# Patient Record
Sex: FEMALE | Race: WHITE | URBAN - METROPOLITAN AREA
[De-identification: names, ages, dates, MRNs, and addresses within clinical notes are randomized per-mention and may not be internally consistent; named-entity substitution may affect disease eponyms.]

---

## 2017-04-06 ENCOUNTER — OFFICE VISIT (OUTPATIENT)
Dept: URGENT CARE | Facility: URGENT CARE | Age: 57
End: 2017-04-06
Payer: COMMERCIAL

## 2017-04-06 ENCOUNTER — MYC MEDICAL ADVICE (OUTPATIENT)
Dept: FAMILY MEDICINE | Facility: CLINIC | Age: 57
End: 2017-04-06

## 2017-04-06 VITALS
BODY MASS INDEX: 34.82 KG/M2 | HEART RATE: 78 BPM | DIASTOLIC BLOOD PRESSURE: 83 MMHG | TEMPERATURE: 97.2 F | WEIGHT: 209 LBS | HEIGHT: 65 IN | SYSTOLIC BLOOD PRESSURE: 129 MMHG | OXYGEN SATURATION: 98 %

## 2017-04-06 DIAGNOSIS — Z12.83 SKIN CANCER SCREENING: Primary | ICD-10-CM

## 2017-04-06 DIAGNOSIS — R05.9 COUGH: ICD-10-CM

## 2017-04-06 DIAGNOSIS — J45.998 ASTHMA, PERSISTENT NOT CONTROLLED: Primary | ICD-10-CM

## 2017-04-06 PROCEDURE — 87801 DETECT AGNT MULT DNA AMPLI: CPT | Performed by: FAMILY MEDICINE

## 2017-04-06 PROCEDURE — 99214 OFFICE O/P EST MOD 30 MIN: CPT | Performed by: FAMILY MEDICINE

## 2017-04-06 RX ORDER — MONTELUKAST SODIUM 10 MG/1
10 TABLET ORAL AT BEDTIME
Qty: 30 TABLET | Refills: 1 | Status: SHIPPED | OUTPATIENT
Start: 2017-04-06 | End: 2017-08-14

## 2017-04-06 NOTE — PATIENT INSTRUCTIONS
Understanding Asthma  Asthma causes swelling and narrowing of the airways in your lungs. No one is exactly sure what causes asthma. It is believed to be a combination of inherited and environmental factors.  Healthy lungs  Inside the lungs there are branching airways made of stretchy tissue. Each airway is wrapped with bands of muscle. The airways are more narrow as they go deeper into the lungs. The smallest airways end in clusters of tiny balloon-like air sacs (alveoli). These clusters are surrounded by blood vessels. When you inhale (breathe in), air enters the lungs. It travels down through the airways until it reaches the air sacs. When you exhale (breathe out), air travels up through the airways and out of the lungs. The airways produce mucus that traps particles you breathe in. Normally, the mucus is then swept out of the lungs, by tiny hairs (cilia) that line the airways, to be swallowed or coughed up.  What the lungs do  The air you inhale contains oxygen. When oxygen reaches the air sacs, it passes into the blood vessels surrounding the sacs. Your blood then delivers oxygen to all of your cells. Carbon dioxide is removed from the body in a similar way, as you exhale.  When you have asthma     1. Tightened muscle  2. Swollen lining  3. Increased mucus   People with asthma have very sensitive airways. This means the airways react to certain things called triggers (such as pollen, dust, or smoke) and become swollen and narrowed. Inflammation makes the airways swollen and narrowed. This is a chronic (long-lasting or recurring) problem. The airways may not always be narrowed enough to notice breathing problems.  Symptoms of chronic inflammation:     Coughing    Chest tightness    Shortness of breath    Wheezing (a whistling noise, especially when breathing out)    Low energy or feeling tired  Over time, chronic mild inflammation can lead to permanent scarring of airways and loss of lung function.  Moderate  flare-ups  When sensitive airways are irritated by a trigger, the muscles around the airways tighten. The lining of the airways swells. Thick, sticky mucus increases and clogs the airways. All of this makes you work harder to keep breathing.  Symptoms of moderate flare-ups:    Coughing, especially at night    Getting tired or out of breath easily    Wheezing    Chest tightness    Faster breathing when at rest  Severe flare-ups   Severe flare-ups are life-threatening. They can cause brain damage or death. In a severe flare-up, the muscle tightening, swelling, and mucus production are even worse. Breathing is extremely difficult. Your body can't get enough oxygen and can't remove carbon dioxide. Waste gas is trapped in the alveoli, and gas exchange can t occur. The body is not getting enough oxygen. Without oxygen, body tissues, especially brain tissue, begin to die. If this goes on for long, it can lead to brain damage or death.  Call 911, or have someone call for you, if you have any of these symptoms and they are not relieved right away by taking your quick-relief medication as prescribed:    Severe difficulty breathing    Being too short of breath to talk or walk    Lips or fingers turning blue    Feeling lightheaded or dizzy, as though you are about to pass out    Peak flow less than 50% of your personal best, if you use peak flow monitoring  Because asthma is a long-term condition, it is important to work with your health care provider to manage it. If you smoke, get help to quit. Know your triggers and figure out how to avoid them. And, it is very important that you take your medications as instructed. That means taking them, even when you feel good.    5152-7399 [employer]. [address] [place] [address], [place] [address]. All rights reserved. This information is not intended as a substitute for professional medical care. Always follow your healthcare professional's instructions.

## 2017-04-06 NOTE — TELEPHONE ENCOUNTER
Kassie,  --Please see patient's MyChart msg in this encounter for rest of her comments.  --I placed derm referral and told her to schedule here for podiatry.    Melanie Guardado RN

## 2017-04-06 NOTE — MR AVS SNAPSHOT
After Visit Summary   4/6/2017    Ofelia Valenzuela    MRN: [id number]           Patient Information     Date Of Birth          1960        Visit Information        Provider Department      4/6/2017 5:45 PM Whitney Gorman MD [facility]        Today's Diagnoses     Asthma, persistent not controlled    -  1    Cough          Care Instructions      Understanding Asthma  Asthma causes swelling and narrowing of the airways in your lungs. No one is exactly sure what causes asthma. It is believed to be a combination of inherited and environmental factors.  Healthy lungs  Inside the lungs there are branching airways made of stretchy tissue. Each airway is wrapped with bands of muscle. The airways are more narrow as they go deeper into the lungs. The smallest airways end in clusters of tiny balloon-like air sacs (alveoli). These clusters are surrounded by blood vessels. When you inhale (breathe in), air enters the lungs. It travels down through the airways until it reaches the air sacs. When you exhale (breathe out), air travels up through the airways and out of the lungs. The airways produce mucus that traps particles you breathe in. Normally, the mucus is then swept out of the lungs, by tiny hairs (cilia) that line the airways, to be swallowed or coughed up.  What the lungs do  The air you inhale contains oxygen. When oxygen reaches the air sacs, it passes into the blood vessels surrounding the sacs. Your blood then delivers oxygen to all of your cells. Carbon dioxide is removed from the body in a similar way, as you exhale.  When you have asthma     1. Tightened muscle  2. Swollen lining  3. Increased mucus   People with asthma have very sensitive airways. This means the airways react to certain things called triggers (such as pollen, dust, or smoke) and become swollen and narrowed. Inflammation makes the airways swollen and narrowed. This is a chronic  (long-lasting or recurring) problem. The airways may not always be narrowed enough to notice breathing problems.  Symptoms of chronic inflammation:     Coughing    Chest tightness    Shortness of breath    Wheezing (a whistling noise, especially when breathing out)    Low energy or feeling tired  Over time, chronic mild inflammation can lead to permanent scarring of airways and loss of lung function.  Moderate flare-ups  When sensitive airways are irritated by a trigger, the muscles around the airways tighten. The lining of the airways swells. Thick, sticky mucus increases and clogs the airways. All of this makes you work harder to keep breathing.  Symptoms of moderate flare-ups:    Coughing, especially at night    Getting tired or out of breath easily    Wheezing    Chest tightness    Faster breathing when at rest  Severe flare-ups   Severe flare-ups are life-threatening. They can cause brain damage or death. In a severe flare-up, the muscle tightening, swelling, and mucus production are even worse. Breathing is extremely difficult. Your body can't get enough oxygen and can't remove carbon dioxide. Waste gas is trapped in the alveoli, and gas exchange can t occur. The body is not getting enough oxygen. Without oxygen, body tissues, especially brain tissue, begin to die. If this goes on for long, it can lead to brain damage or death.  Call 911, or have someone call for you, if you have any of these symptoms and they are not relieved right away by taking your quick-relief medication as prescribed:    Severe difficulty breathing    Being too short of breath to talk or walk    Lips or fingers turning blue    Feeling lightheaded or dizzy, as though you are about to pass out    Peak flow less than 50% of your personal best, if you use peak flow monitoring  Because asthma is a long-term condition, it is important to work with your health care provider to manage it. If you smoke, get help to quit. Know your triggers and  figure out how to avoid them. And, it is very important that you take your medications as instructed. That means taking them, even when you feel good.    6890-4155 [employer]. [address] [place] [address], [place] [address]. All rights reserved. This information is not intended as a substitute for professional medical care. Always follow your healthcare professional's instructions.              Follow-ups after your visit        Follow-up notes from your care team     Return in about 3 weeks (around 4/27/2017).      Who to contact     If you have questions or need follow up information about today's clinic visit or your schedule please contact [facility] directly at [phone].  Normal or non-critical lab and imaging results will be communicated to you by [employer], letter or phone within 4 business days after the clinic has received the results. If you do not hear from us within 7 days, please contact the clinic through [employer] or phone. If you have a critical or abnormal lab result, we will notify you by phone as soon as possible.  Submit refill requests through [employer] or call your pharmacy and they will forward the refill request to us. Please allow 3 business days for your refill to be completed.          Additional Information About Your Visit        GigmaxharBRAND-YOURSELF Information     [employer] gives you secure access to your electronic health record. If you see a primary care provider, you can also send messages to your care team and make appointments. If you have questions, please call your primary care clinic.  If you do not have a primary care provider, please call [phone] and they will assist you.        Care EveryWhere ID     This is your Care EveryWhere ID. This could be used by other organizations to access your [facility] medical records  [id number]        Your Vitals Were     Pulse Temperature Height Last Period Pulse Oximetry BMI (Body Mass Index)    78 97.2  F (36.2  C)  "(Tympanic) 5' 4.5\" (1.638 m) 12/07/2006 98% 35.32 kg/m2       Blood Pressure from Last 3 Encounters:   04/06/17 129/83   12/21/16 118/72   11/22/16 136/85    Weight from Last 3 Encounters:   04/06/17 209 lb (94.8 kg)   12/21/16 207 lb (93.9 kg)   11/22/16 210 lb (95.3 kg)              We Performed the Following     Jocelyndejulia pedroza parapert DNA pcr          Today's Medication Changes          These changes are accurate as of: 4/6/17  6:14 PM.  If you have any questions, ask your nurse or doctor.               Start taking these medicines.        Dose/Directions    montelukast 10 MG tablet   Commonly known as:  SINGULAIR   Used for:  Asthma, persistent not controlled   Started by:  Whitney Gorman MD        Dose:  10 mg   Take 1 tablet (10 mg) by mouth At Bedtime   Quantity:  30 tablet   Refills:  1            Where to get your medicines      Some of these will need a paper prescription and others can be bought over the counter.  Ask your nurse if you have questions.     Bring a paper prescription for each of these medications     montelukast 10 MG tablet                Primary Care Provider Office Phone # Fax #    Kassie ZEB Rivera [facility] [phone]       [place] [id number]  [facility] [address]        Thank you!     Thank you for choosing [facility]  for your care. Our goal is always to provide you with excellent care. Hearing back from our patients is one way we can continue to improve our services. Please take a few minutes to complete the written survey that you may receive in the mail after your visit with us. Thank you!             Your Updated Medication List - Protect others around you: Learn how to safely use, store and throw away your medicines at www.disposemymeds.org.          This list is accurate as of: 4/6/17  6:14 PM.  Always use your most recent med list.                   Brand Name Dispense Instructions for use    " albuterol 108 (90 BASE) MCG/ACT Inhaler    albuterol    2 Inhaler    Inhale 2 puffs into the lungs every 4 hours as needed for shortness of breath / dyspnea or wheezing       Calcium 2211-1826 MG-UNIT Chew      Take 2 chew tab by mouth daily.       DAILY MULTIVITAMIN PO      Take 1 tablet by mouth daily.       fluticasone 110 MCG/ACT Inhaler    FLOVENT HFA    1 Inhaler    Inhale 2 puffs into the lungs 2 times daily       fluticasone 50 MCG/ACT spray    FLONASE    1 Bottle    Spray 1-2 sprays into both nostrils daily       glucosamine complex Tabs      Take 1 capsule by mouth daily.       montelukast 10 MG tablet    SINGULAIR    30 tablet    Take 1 tablet (10 mg) by mouth At Bedtime       predniSONE 20 MG tablet    DELTASONE    10 tablet    Take 1 tablet (20 mg) by mouth 2 times daily       vitamin D 1000 UNITS capsule      Take 1 capsule by mouth daily.

## 2017-04-06 NOTE — NURSING NOTE
"Chief Complaint   Patient presents with     Urgent Care     Pt in clinic to have eval for intermittent cough for 5 months.     Respiratory Problems       Initial /83 (BP Location: Left arm, Patient Position: Chair, Cuff Size: Adult Large)  Pulse 78  Temp 97.2  F (36.2  C) (Tympanic)  Ht 5' 4.5\" (1.638 m)  Wt 209 lb (94.8 kg)  LMP 12/07/2006  SpO2 98%  BMI 35.32 kg/m2 Estimated body mass index is 35.32 kg/(m^2) as calculated from the following:    Height as of this encounter: 5' 4.5\" (1.638 m).    Weight as of this encounter: 209 lb (94.8 kg).  Medication Reconciliation: complete   Toña Alvarez/ MA    "

## 2017-04-07 DIAGNOSIS — Z87.19 HISTORY OF MELENA: Primary | ICD-10-CM

## 2017-04-07 DIAGNOSIS — M79.673 PAIN IN UNSPECIFIED FOOT: ICD-10-CM

## 2017-04-10 ENCOUNTER — TELEPHONE (OUTPATIENT)
Dept: FAMILY MEDICINE | Facility: CLINIC | Age: 57
End: 2017-04-10

## 2017-04-10 LAB
B PERT+PARAPERT DNA PNL SPEC NAA+PROBE: NORMAL
SPECIMEN SOURCE: NORMAL

## 2017-04-10 NOTE — TELEPHONE ENCOUNTER
Reason for Call:  Request for results:    Name of test or procedure: Ilda pedroza parapert DNA pcr [[id number]] (Order [id number])     Date of test of procedure: 4/6/2017    Location of the test or procedure:  Urgent Care    OK to leave the result message on voice mail or with a family member? YES    Phone number Patient can be reached at:  Cell number on file:    Telephone Information:   Mobile [phone]       Additional comments: Patient is requesting a call back once the lab result from the date above is final. States she still has a bad cough and needs these results to determine if she should be on an anti-biotic or not. Please follow up. Thanks!    Call taken on 4/10/2017 at 1:41 PM by Elda Dykes

## 2017-04-12 ENCOUNTER — MYC MEDICAL ADVICE (OUTPATIENT)
Dept: FAMILY MEDICINE | Facility: CLINIC | Age: 57
End: 2017-04-12

## 2017-04-12 DIAGNOSIS — J45.909 ASTHMA: ICD-10-CM

## 2017-04-12 DIAGNOSIS — R05.9 COUGH: Primary | ICD-10-CM

## 2017-04-12 NOTE — TELEPHONE ENCOUNTER
Routing to PCP.    Kassie-Please review and advise.  Would you like patient to follow up with you tomorrow or Friday?    Thank you!  SALVATORE EnglishN, RN

## 2017-04-13 DIAGNOSIS — J45.31 MILD PERSISTENT ASTHMA WITH ACUTE EXACERBATION: Primary | ICD-10-CM

## 2017-04-13 RX ORDER — PREDNISONE 20 MG/1
TABLET ORAL
Qty: 20 TABLET | Refills: 0 | Status: SHIPPED | OUTPATIENT
Start: 2017-04-13 | End: 2017-08-14

## 2017-04-27 ENCOUNTER — OFFICE VISIT (OUTPATIENT)
Dept: FAMILY MEDICINE | Facility: CLINIC | Age: 57
End: 2017-04-27
Payer: COMMERCIAL

## 2017-04-27 ENCOUNTER — TELEPHONE (OUTPATIENT)
Dept: FAMILY MEDICINE | Facility: CLINIC | Age: 57
End: 2017-04-27

## 2017-04-27 VITALS
SYSTOLIC BLOOD PRESSURE: 122 MMHG | TEMPERATURE: 97.5 F | DIASTOLIC BLOOD PRESSURE: 74 MMHG | BODY MASS INDEX: 36.5 KG/M2 | WEIGHT: 216 LBS | OXYGEN SATURATION: 97 % | HEART RATE: 79 BPM | RESPIRATION RATE: 16 BRPM

## 2017-04-27 DIAGNOSIS — J45.31 MILD PERSISTENT ASTHMA WITH ACUTE EXACERBATION: Primary | ICD-10-CM

## 2017-04-27 DIAGNOSIS — B02.9 HERPES ZOSTER WITHOUT COMPLICATION: Primary | ICD-10-CM

## 2017-04-27 DIAGNOSIS — R05.9 COUGH: ICD-10-CM

## 2017-04-27 DIAGNOSIS — B02.9 HERPES ZOSTER WITHOUT COMPLICATION: ICD-10-CM

## 2017-04-27 DIAGNOSIS — R21 FACIAL RASH: ICD-10-CM

## 2017-04-27 PROCEDURE — 87252 VIRUS INOCULATION TISSUE: CPT | Performed by: NURSE PRACTITIONER

## 2017-04-27 PROCEDURE — 99214 OFFICE O/P EST MOD 30 MIN: CPT | Performed by: NURSE PRACTITIONER

## 2017-04-27 RX ORDER — BUDESONIDE AND FORMOTEROL FUMARATE DIHYDRATE 160; 4.5 UG/1; UG/1
2 AEROSOL RESPIRATORY (INHALATION) 2 TIMES DAILY
Qty: 1 INHALER | Refills: 1 | Status: SHIPPED | OUTPATIENT
Start: 2017-04-27 | End: 2017-08-14

## 2017-04-27 RX ORDER — VALACYCLOVIR HYDROCHLORIDE 1 G/1
1000 TABLET, FILM COATED ORAL 3 TIMES DAILY
Qty: 21 TABLET | Refills: 0 | Status: SHIPPED | OUTPATIENT
Start: 2017-04-27 | End: 2017-08-14

## 2017-04-27 NOTE — TELEPHONE ENCOUNTER
Patient with possible shingles to opthalmic dermatome.  Needs opthalmology appt tomorrow to r/o occular involvement.  Pt describes pressure sensation behind eyes but no vision changes.    Triage can we try to get pt in tomorrow with U rojelio QUEVEDO optbruce or Dr. Hawkins.  Please call pt with appt info, her schedule is open tomorrow.    [facility] [phone]      Dr. Miki Hawkins, opthalmologist    Primary Location  [facility]   [address]   [place] [address]   Phone:   [phone]

## 2017-04-27 NOTE — LETTER
My Asthma Action Plan  Name: Ofelia Valenzuela   YOB: 1960  Date: 4/27/2017   My doctor: ZEB Ayoub CNP   [place] [facility] [place] [facility] [place]        My Control Medicine: Fluticasone propionate (Flovent) -   mcg 2 puffs teice daily  Montelukast (Singulair) -  10 mg once daily  My Rescue Medicine: Albuterol (Proair/Ventolin/Proventil) inhaler 2 puffs as needed   My Asthma Severity: mild persistent  Avoid your asthma triggers: smoke, upper respiratory infections and dust mites               GREEN ZONE     Good Control    I feel good    No cough or wheeze    Can work, sleep and play without asthma symptoms       Take your asthma control medicine every day.     1. If exercise triggers your asthma, take your rescue medication    15 minutes before exercise or sports, and    During exercise if you have asthma symptoms  2. Spacer to use with inhaler: If you have a spacer, make sure to use it with your inhaler             YELLOW ZONE     Getting Worse  I have ANY of these:    I do not feel good    Cough or wheeze    Chest feels tight    Wake up at night   1. Keep taking your Green Zone medications  2. Start taking your rescue medicine:    every 20 minutes for up to 1 hour. Then every 4 hours for 24-48 hours.  3. If you stay in the Yellow Zone for more than 12-24 hours, contact your doctor.  4. If you do not return to the Green Zone in 12-24 hours or you get worse, start taking your oral steroid medicine if prescribed by your provider.           RED ZONE     Medical Alert - Get Help  I have ANY of these:    I feel awful    Medicine is not helping    Breathing getting harder    Trouble walking or talking    Nose opens wide to breathe       1. Take your rescue medicine NOW  2. If your provider has prescribed an oral steroid medicine, start taking it NOW  3. Call your doctor NOW  4. If you are still in the Red Zone after 20 minutes and you have not reached your doctor:    Take your rescue  medicine again and    Call 911 or go to the emergency room right away    See your regular doctor within 2 weeks of an Emergency Room or Urgent Care visit for follow-up treatment.        Electronically signed by: Saray Bashir, April 27, 2017    Annual Reminders:  Meet with Asthma Educator,  Flu Shot in the Fall, consider Pneumonia Vaccination for patients with asthma (aged 19 and older).    Pharmacy: [facility] [address] [place] [address] AT [address]                    Asthma Triggers  How To Control Things That Make Your Asthma Worse    Triggers are things that make your asthma worse.  Look at the list below to help you find your triggers and what you can do about them.  You can help prevent asthma flare-ups by staying away from your triggers.      Trigger                                                          What you can do   Cigarette Smoke  Tobacco smoke can make asthma worse. Do not allow smoking in your home, car or around you.  Be sure no one smokes at a child s day care or school.  If you smoke, ask your health care provider for ways to help you quit.  Ask family members to quit too.  Ask your health care provider for a referral to Quit Plan to help you quit smoking, or call 1-854-578-PLAN.     Colds, Flu, Bronchitis  These are common triggers of asthma. Wash your hands often.  Don t touch your eyes, nose or mouth.  Get a flu shot every year.     Dust Mites  These are tiny bugs that live in cloth or carpet. They are too small to see. Wash sheets and blankets in hot water every week.   Encase pillows and mattress in dust mite proof covers.  Avoid having carpet if you can. If you have carpet, vacuum weekly.   Use a dust mask and HEPA vacuum.   Pollen and Outdoor Mold  Some people are allergic to trees, grass, or weed pollen, or molds. Try to keep your windows closed.  Limit time out doors when pollen count is high.   Ask you health care provider about taking medicine during  allergy season.     Animal Dander  Some people are allergic to skin flakes, urine or saliva from pets with fur or feathers. Keep pets with fur or feathers out of your home.    If you can t keep the pet outdoors, then keep the pet out of your bedroom.  Keep the bedroom door closed.  Keep pets off cloth furniture and away from stuffed toys.     Mice, Rats, and Cockroaches  Some people are allergic to the waste from these pests.   Cover food and garbage.  Clean up spills and food crumbs.  Store grease in the refrigerator.   Keep food out of the bedroom.   Indoor Mold  This can be a trigger if your home has high moisture. Fix leaking faucets, pipes, or other sources of water.   Clean moldy surfaces.  Dehumidify basement if it is damp and smelly.   Smoke, Strong Odors, and Sprays  These can reduce air quality. Stay away from strong odors and sprays, such as perfume, powder, hair spray, paints, smoke incense, paint, cleaning products, candles and new carpet.   Exercise or Sports  Some people with asthma have this trigger. Be active!  Ask your doctor about taking medicine before sports or exercise to prevent symptoms.    Warm up for 5-10 minutes before and after sports or exercise.     Other Triggers of Asthma  Cold air:  Cover your nose and mouth with a scarf.  Sometimes laughing or crying can be a trigger.  Some medicines and food can trigger asthma.

## 2017-04-27 NOTE — NURSING NOTE
"Chief Complaint   Patient presents with     Derm Problem       Initial /74 (BP Location: Right arm, Patient Position: Chair, Cuff Size: Adult Large)  Pulse 79  Temp 97.5  F (36.4  C) (Tympanic)  Resp 16  Wt 216 lb (98 kg)  LMP 12/07/2006  SpO2 97%  BMI 36.5 kg/m2 Estimated body mass index is 36.5 kg/(m^2) as calculated from the following:    Height as of 4/6/17: 5' 4.5\" (1.638 m).    Weight as of this encounter: 216 lb (98 kg).  Medication Reconciliation: complete     Saray Bashir, CMA    "

## 2017-04-27 NOTE — MR AVS SNAPSHOT
After Visit Summary   4/27/2017    Ofelia Valenzuela    MRN: [id number]           Patient Information     Date Of Birth          1960        Visit Information        Provider Department      4/27/2017 4:20 PM Kassie Moss APRN CNP [facility]        Today's Diagnoses     Mild persistent asthma with acute exacerbation    -  1    Facial rash        Herpes zoster without complication        Cough          Care Instructions    1.  For cough switch to combination inhaler (symbicort, advair, or dulera) instead of flovent.  Continue albuterol as needed.  If cough still not improving see pulmonology    2.  Rash to face could be shingles.  Start valtrex three times a day for 7 days.  Rash is contagious until scabbed over.  Avoid children under 12mo, elderly, and pregnant people.  Viral culture pending.  Follow up with eye specialist tomorrow to ensure no eye lesions.          Follow-ups after your visit        Additional Services     PULMONARY MEDICINE REFERRAL       Your provider has referred you to: [facility]: Lung Disease and Pulmonary Clinic [facility] [phone]   [email or web address]    Please be aware that coverage of these services is subject to the terms and limitations of your health insurance plan.  Call member services at your health plan with any benefit or coverage questions.      Please bring the following with you to your appointment:    (1) Any X-Rays, CTs or MRIs which have been performed.  Contact the facility where they were done to arrange for  prior to your scheduled appointment.    (2) List of current medications   (3) This referral request   (4) Any documents/labs given to you for this referral                  Who to contact     If you have questions or need follow up information about today's clinic visit or your schedule please contact [facility] directly at [phone].  Normal or  non-critical lab and imaging results will be communicated to you by [employer], letter or phone within 4 business days after the clinic has received the results. If you do not hear from us within 7 days, please contact the clinic through [employer] or phone. If you have a critical or abnormal lab result, we will notify you by phone as soon as possible.  Submit refill requests through [employer] or call your pharmacy and they will forward the refill request to us. Please allow 3 business days for your refill to be completed.          Additional Information About Your Visit        [employer] Information     [employer] gives you secure access to your electronic health record. If you see a primary care provider, you can also send messages to your care team and make appointments. If you have questions, please call your primary care clinic.  If you do not have a primary care provider, please call [phone] and they will assist you.        Care EveryWhere ID     This is your Care EveryWhere ID. This could be used by other organizations to access your [facility] medical records  [id number]        Your Vitals Were     Pulse Temperature Respirations Last Period Pulse Oximetry BMI (Body Mass Index)    79 97.5  F (36.4  C) (Tympanic) 16 12/07/2006 97% 36.5 kg/m2       Blood Pressure from Last 3 Encounters:   04/27/17 122/74   04/06/17 129/83   12/21/16 118/72    Weight from Last 3 Encounters:   04/27/17 216 lb (98 kg)   04/06/17 209 lb (94.8 kg)   12/21/16 207 lb (93.9 kg)              We Performed the Following     Asthma Action Plan (AAP)     PULMONARY MEDICINE REFERRAL     Viral culture          Today's Medication Changes          These changes are accurate as of: 4/27/17  4:57 PM.  If you have any questions, ask your nurse or doctor.               Start taking these medicines.        Dose/Directions    budesonide-formoterol 160-4.5 MCG/ACT Inhaler   Commonly known as:  SYMBICORT   Used for:  Mild persistent asthma with acute  exacerbation   Started by:  Kassie Moss APRN CNP        Dose:  2 puff   Inhale 2 puffs into the lungs 2 times daily   Quantity:  1 Inhaler   Refills:  1       fluticasone-salmeterol 250-50 MCG/DOSE diskus inhaler   Commonly known as:  ADVAIR   Used for:  Mild persistent asthma with acute exacerbation   Started by:  Kassie Moss APRN CNP        Dose:  1 puff   Inhale 1 puff into the lungs 2 times daily   Quantity:  1 Inhaler   Refills:  1       mometasone-formoterol 200-5 MCG/ACT oral inhaler   Commonly known as:  DULERA   Used for:  Mild persistent asthma with acute exacerbation   Started by:  Kassie Moss APRN CNP        Dose:  2 puff   Inhale 2 puffs into the lungs 2 times daily   Quantity:  13 g   Refills:  1       valACYclovir 1000 mg tablet   Commonly known as:  VALTREX   Used for:  Herpes zoster without complication   Started by:  Kassie Moss APRN CNP        Dose:  1000 mg   Take 1 tablet (1,000 mg) by mouth 3 times daily   Quantity:  21 tablet   Refills:  0         Stop taking these medicines if you haven't already. Please contact your care team if you have questions.     fluticasone 110 MCG/ACT Inhaler   Commonly known as:  FLOVENT HFA   Stopped by:  Kassie Moss APRN CNP                Where to get your medicines      These medications were sent to [email or web address] [id number] [place] [address] AT [address] [address]     Phone:  [phone]     budesonide-formoterol 160-4.5 MCG/ACT Inhaler    fluticasone-salmeterol 250-50 MCG/DOSE diskus inhaler    mometasone-formoterol 200-5 MCG/ACT oral inhaler    valACYclovir 1000 mg tablet                Primary Care Provider Office Phone # Fax #    ZEB Ayoub [phone]       [facility] [address]  [facility] [address]        Thank you!     Thank you for choosing [facility]  for your care. Our goal  is always to provide you with excellent care. Hearing back from our patients is one way we can continue to improve our services. Please take a few minutes to complete the written survey that you may receive in the mail after your visit with us. Thank you!             Your Updated Medication List - Protect others around you: Learn how to safely use, store and throw away your medicines at www.disposemymeds.org.          This list is accurate as of: 4/27/17  4:57 PM.  Always use your most recent med list.                   Brand Name Dispense Instructions for use    albuterol 108 (90 BASE) MCG/ACT Inhaler    albuterol    2 Inhaler    Inhale 2 puffs into the lungs every 4 hours as needed for shortness of breath / dyspnea or wheezing       budesonide-formoterol 160-4.5 MCG/ACT Inhaler    SYMBICORT    1 Inhaler    Inhale 2 puffs into the lungs 2 times daily       Calcium 7651-4720 MG-UNIT Chew      Take 2 chew tab by mouth daily Reported on 4/27/2017       DAILY MULTIVITAMIN PO      Take 1 tablet by mouth daily Reported on 4/27/2017       fluticasone 50 MCG/ACT spray    FLONASE    1 Bottle    Spray 1-2 sprays into both nostrils daily       fluticasone-salmeterol 250-50 MCG/DOSE diskus inhaler    ADVAIR    1 Inhaler    Inhale 1 puff into the lungs 2 times daily       glucosamine complex Tabs      Take 1 capsule by mouth daily Reported on 4/27/2017       mometasone-formoterol 200-5 MCG/ACT oral inhaler    DULERA    13 g    Inhale 2 puffs into the lungs 2 times daily       montelukast 10 MG tablet    SINGULAIR    30 tablet    Take 1 tablet (10 mg) by mouth At Bedtime       * predniSONE 20 MG tablet    DELTASONE    10 tablet    Take 1 tablet (20 mg) by mouth 2 times daily       * predniSONE 20 MG tablet    DELTASONE    20 tablet    Take 3 tabs (60 mg) by mouth daily x 3 days, 2 tabs (40 mg) daily x 3 days, 1 tab (20 mg) daily x 3 days, then 1/2 tab (10 mg) x 3 days.       valACYclovir 1000 mg tablet    VALTREX    21 tablet     Take 1 tablet (1,000 mg) by mouth 3 times daily       vitamin D 1000 UNITS capsule      Take 1 capsule by mouth daily Reported on 4/27/2017       * Notice:  This list has 2 medication(s) that are the same as other medications prescribed for you. Read the directions carefully, and ask your doctor or other care provider to review them with you.

## 2017-04-27 NOTE — TELEPHONE ENCOUNTER
Patient has been battling a URI for the past 6 weeks to 2 months  Recently completed a prednisone taper on 4/24/17.  Patient continues with cough that is barky and non-productive  Has now developed a red rash on her forehead and is concerned she may have shingles.  Appointment schedule for later today to assess rash.  Batsheva Anaya RN

## 2017-04-27 NOTE — TELEPHONE ENCOUNTER
Writer called Dr. Willett's office, spoke with Lizzie and reviewed message per below from CHRISTAL Moss.    Lizzie asked for demographic information for patient and insurance information and writer relayed that information to Lizzie.    Lizzie informed writer they would reach out to patient directly.    Kassie lucas.    SALVATORE EnglishN, RN

## 2017-04-27 NOTE — PATIENT INSTRUCTIONS
1.  For cough switch to combination inhaler (symbicort, advair, or dulera) instead of flovent.  Continue albuterol as needed.  If cough still not improving see pulmonology    2.  Rash to face could be shingles.  Start valtrex three times a day for 7 days.  Rash is contagious until scabbed over.  Avoid children under 12mo, elderly, and pregnant people.  Viral culture pending.  Follow up with eye specialist tomorrow to ensure no eye lesions.

## 2017-04-27 NOTE — PROGRESS NOTES
SUBJECTIVE:                                                    Ofelia Valenzuela is a 56 year old female who presents to clinic today for the following health issues:    Rash      Duration: 2 days    Description  Location: right side forehead  Itching: moderate    Intensity:  moderate    Accompanying signs and symptoms: irritating dry cough    History (similar episodes/previous evaluation): has been on Prednisone for 3 weeks for asthma flare and cough. Had Pertussis exposure in March    Precipitating or alleviating factors:  New exposures:  [employer] plant near her desk area  Recent travel: no      Therapies tried and outcome: none     No prodrome to rash.  Noticed to right forehead 2 days ago.  Rash is not painful, it is itchy.  Started as several lesions, more lesions today.  No history similar rash. Notes pressure sensation to both eyes which started today, no vision changes.      Pt developed cough in march.  Hx of asthma, on flovent twice a day and albuterol.  Treated at home with prednisone in March with improvement in symptoms.  Cough then reoccurred end of march.  Seen again in UC 4/6/17, neg pertussis.  prednisone taper 4/13/17.    Reports generalized achiness.  Dry cough, coughing fit once a day.  Has improved with prednisone.  Ongoing irritation, interfering with her ability to sing.  Raspiness.  No her usual lingering bronchial cough, feels like ongoing lung irritation that never went away.  In early April would wake up with chills, no objective fever.  Mild rhinitis and raspy throat, no other URI symptoms.  Started singulair after UC visit, not really helping.  No history seasonal allergies.  Generalized fatgiue.  No wheezing or chest tightness, no distinct shortness of breath.  Albuterol seems to help.      Patient Active Problem List   Diagnosis     Mild persistent asthma     Allergic rhinitis     Enthesopathy     CARDIOVASCULAR SCREENING; LDL GOAL LESS THAN 160     Knee pain     Rotator cuff  disorder     Multinodular goiter     Obesity     Family history of diabetes mellitus     Past Surgical History:   Procedure Laterality Date     C NONSPECIFIC PROCEDURE  1995    fx (R) 5th metatarsal  MTT or MTP joints not involved     SURGICAL HISTORY OF -   1976    tumor removed from lymph node     SURGICAL HISTORY OF -   1969    tonsillectomy       Social History   Substance Use Topics     Smoking status: Never Smoker     Smokeless tobacco: Never Used     Alcohol use Yes      Comment: Very Rarely, glass of wine once a week/month     Family History   Problem Relation Age of Onset     C.A.D. Father      CABG at age 70     DIABETES Father      type 2 at age 60     Hypertension Father      Cardiovascular Father      tachycardia, valve problem     Lipids Father      Obesity Father      DIABETES Mother      Type 2-at age 60     Eye Disorder Mother      cataract     Obesity Mother      Respiratory Mother      lung problems from smoking-chronic cough     DIABETES Maternal Grandmother      type 2     Obesity Maternal Grandmother      DIABETES Paternal Grandmother      type 2     CANCER Paternal Grandmother      unknown type--metastisize everywhere     Obesity Paternal Grandmother      DIABETES Paternal Grandfather      type 2     Alcohol/Drug Paternal Grandfather      Obesity Paternal Grandfather      DIABETES Sister      gestational     DIABETES Brother      type 2-age 40 (2 brothers)     Alcohol/Drug Brother      Gynecology Sister      endometriosis, and unusual vag bleeding      Obesity Sister      Obesity Brother      Obesity Maternal Grandfather          Current Outpatient Prescriptions   Medication Sig Dispense Refill     fluticasone-salmeterol (ADVAIR) 250-50 MCG/DOSE diskus inhaler Inhale 1 puff into the lungs 2 times daily 1 Inhaler 1     mometasone-formoterol (DULERA) 200-5 MCG/ACT oral inhaler Inhale 2 puffs into the lungs 2 times daily 13 g 1     budesonide-formoterol (SYMBICORT) 160-4.5 MCG/ACT Inhaler Inhale  2 puffs into the lungs 2 times daily 1 Inhaler 1     valACYclovir (VALTREX) 1000 mg tablet Take 1 tablet (1,000 mg) by mouth 3 times daily 21 tablet 0     montelukast (SINGULAIR) 10 MG tablet Take 1 tablet (10 mg) by mouth At Bedtime 30 tablet 1     albuterol (ALBUTEROL) 108 (90 BASE) MCG/ACT Inhaler Inhale 2 puffs into the lungs every 4 hours as needed for shortness of breath / dyspnea or wheezing 2 Inhaler 3     predniSONE (DELTASONE) 20 MG tablet Take 3 tabs (60 mg) by mouth daily x 3 days, 2 tabs (40 mg) daily x 3 days, 1 tab (20 mg) daily x 3 days, then 1/2 tab (10 mg) x 3 days. (Patient not taking: Reported on 4/27/2017) 20 tablet 0     predniSONE (DELTASONE) 20 MG tablet Take 1 tablet (20 mg) by mouth 2 times daily (Patient not taking: Reported on 4/27/2017) 10 tablet 0     fluticasone (FLONASE) 50 MCG/ACT spray Spray 1-2 sprays into both nostrils daily (Patient not taking: Reported on 4/27/2017) 1 Bottle 1     Multiple Vitamin (DAILY MULTIVITAMIN PO) Take 1 tablet by mouth daily Reported on 4/27/2017       Cholecalciferol (VITAMIN D) 1000 UNIT capsule Take 1 capsule by mouth daily Reported on 4/27/2017       Nutritional Supplements (GLUCOSAMINE COMPLEX) TABS Take 1 capsule by mouth daily Reported on 4/27/2017       Calcium 9663-2162 MG-UNIT CHEW Take 2 chew tab by mouth daily Reported on 4/27/2017         ROS:  Const, HEENT, Resp, Integ as above, otherwise negative       OBJECTIVE:                                                    /74 (BP Location: Right arm, Patient Position: Chair, Cuff Size: Adult Large)  Pulse 79  Temp 97.5  F (36.4  C) (Tympanic)  Resp 16  Wt 216 lb (98 kg)  LMP 12/07/2006  SpO2 97%  BMI 36.5 kg/m2   GENERAL APPEARANCE: healthy, alert and no distress  EYES: Eyes grossly normal to inspection and conjunctivae and sclerae normal  HENT: ear canals and TM's normal and nose and mouth without ulcers or lesions  NECK: no adenopathy  RESP: lungs clear to auscultation - no rales,  rhonchi or wheezes  CV: regular rates and rhythm, normal S1 S2, no S3 or S4 and no murmur, click or rub  SKIN: cluster of pustules to right side of forehead, do not cross over to left side.  pustules 1-2mm in diameter with surrounding erythema, they are not tender  PSYCH: mentation appears normal and affect normal/bright        ASSESSMENT/PLAN:                                                    (J45.31) Mild persistent asthma with acute exacerbation  (primary encounter diagnosis)  (R05) Cough  Comment: persistent cough since march with mild improvement on prednisone.  No symptoms of pneumonai (lungs CTA, afebrile, stable O2 sats).  Suspect postviral inflammation with underlying asthma.  Also consider allergy component, Pt notes she saw allergist in the past and was started on allergy shots with improvement in asthma flares. She is currently on singulair   Plan: Asthma Action Plan (AAP),         fluticasone-salmeterol (ADVAIR) 250-50 MCG/DOSE        diskus inhaler, mometasone-formoterol (DULERA)         200-5 MCG/ACT oral inhaler,         budesonide-formoterol (SYMBICORT) 160-4.5         MCG/ACT Inhaler, PULMONARY MEDICINE REFERRAL        Change from ICS to combo ICS/BB, cont albuterol and singulair.  If not improving in 2 weeks follow up with pulm.      (R21) Facial rash  Comment: suspicious for shingles, confined to opthalmic dermatome.  No prodrome and not painful but suspicious in setting of recent immunosupression  Plan: Viral culture        Viral culture pending, start valtrex three times a day x 1 week.  Given location opthalmology eval scheduled for tonight to r/o occular involvement.  Discussed natural history of shingles and reviewed lesions are contagious until scabbed over, advised avoiding contact with children < 12mo, pregnant women, or elderly.      (B02.9) Herpes zoster without complication  Plan: valACYclovir (VALTREX) 1000 mg tablet        As above          See Patient Instructions    Kassie Moss  [facility]    Patient Instructions   1.  For cough switch to combination inhaler (symbicort, advair, or dulera) instead of flovent.  Continue albuterol as needed.  If cough still not improving see pulmonology    2.  Rash to face could be shingles.  Start valtrex three times a day for 7 days.  Rash is contagious until scabbed over.  Avoid children under 12mo, elderly, and pregnant people.  Viral culture pending.  Follow up with eye specialist tomorrow to ensure no eye lesions.

## 2017-05-03 ENCOUNTER — MYC MEDICAL ADVICE (OUTPATIENT)
Dept: FAMILY MEDICINE | Facility: CLINIC | Age: 57
End: 2017-05-03

## 2017-05-03 NOTE — TELEPHONE ENCOUNTER
I am not sure how long viral culture usually takes, can we check with lab?  I would think it can take 1 week but not sure.    Kassie Moss, CNP

## 2017-05-03 NOTE — TELEPHONE ENCOUNTER
4/27/17 viral culture still in process.    Kassie-Please review.  Does it usually take longer than 1 week for viral culture to result?    Thank you!  DELISA Culp, SALVATOREN, RN

## 2017-05-04 ASSESSMENT — ASTHMA QUESTIONNAIRES: ACT_TOTALSCORE: 25

## 2017-05-05 ENCOUNTER — TELEPHONE (OUTPATIENT)
Dept: FAMILY MEDICINE | Facility: CLINIC | Age: 57
End: 2017-05-05

## 2017-05-05 NOTE — TELEPHONE ENCOUNTER
Patient was seen last week by Kassie Moss and put on an increased dose of Prednisone  States when she was on the last day of Prednisone she broke out in a rash on 1/2 of her face  Was thought this might be shingles so patient was started on Valacyclovir.  Viral culture is still pending  Patient states the rash on her face seems to be fading and she has more energy, but now she has broken out in rash on both of her lower legs that is quite itchy  In addition she is developing a cold sore and has not had one for years    Wondering what she can do or should she go to the  this evening?  Batsheva Anaya, RN

## 2017-05-05 NOTE — TELEPHONE ENCOUNTER
"Goodness  Valacyclovir should help with the cold sore.  She can take over the counter antihistamines - Zyrtec, Allegra or Claritin during the day; benedryl at night if you have significant symptoms. This medication will make you very drowsy.   And follow up in UC with any worsening or changes in symptoms.  Thanks  Keeley \"Pepito\" ZACHERY Marroquin   "

## 2017-05-05 NOTE — TELEPHONE ENCOUNTER
Left message on machine to call back.  Ask to speak to an RN, let them know it's a return call.  Leave a number and time that you can be reached.   Batsheva Anaya RN

## 2017-05-12 LAB
SPECIMEN SOURCE: NORMAL
STATUS - QUEST: NORMAL
VIRUS SPEC CULT: NORMAL

## 2017-05-12 NOTE — PROGRESS NOTES
Domonique,    Final viral culture was negative for any viral infection.  This either means we did not get a good sample of the lesion, or it was not caused by herpes or shingles.  If you get recurrent episodes that would be more consistent with herpes outbreak, we can try culturing the lesion again if it reoccurs.      Kassie Benavidez, CNP

## 2017-06-26 ENCOUNTER — MYC MEDICAL ADVICE (OUTPATIENT)
Dept: FAMILY MEDICINE | Facility: CLINIC | Age: 57
End: 2017-06-26

## 2017-06-26 DIAGNOSIS — R21 FACIAL RASH: Primary | ICD-10-CM

## 2017-06-26 NOTE — TELEPHONE ENCOUNTER
Patient was seen 4/27/17 in clinic and sent to Ophthalmology  Please see telephone encounter from same day.  I see a referral but it only lists [facility] and they were not able ot see patient.  Appears we did send patient to [facility] but referral was not updated.  Please advise.  .[email or web address]

## 2017-06-27 NOTE — TELEPHONE ENCOUNTER
Looking at TE 4/27 I arranged same day appt with [facility] specialist.  Can we run this by our referral specialist?  I send a lot of patients there without making an official referral, but I know they are not officially in our network.  Since I arranged this appointment I feel I should make a referral for the patient.    Kassie Benavidez, CNP

## 2017-07-08 ENCOUNTER — HEALTH MAINTENANCE LETTER (OUTPATIENT)
Age: 57
End: 2017-07-08

## 2017-07-13 ENCOUNTER — OFFICE VISIT (OUTPATIENT)
Dept: PODIATRY | Facility: CLINIC | Age: 57
End: 2017-07-13
Payer: COMMERCIAL

## 2017-07-13 VITALS
HEART RATE: 74 BPM | DIASTOLIC BLOOD PRESSURE: 64 MMHG | SYSTOLIC BLOOD PRESSURE: 108 MMHG | WEIGHT: 205 LBS | BODY MASS INDEX: 34.16 KG/M2 | HEIGHT: 65 IN

## 2017-07-13 DIAGNOSIS — M19.079 ARTHRITIS, MIDFOOT: ICD-10-CM

## 2017-07-13 DIAGNOSIS — M20.5X2 HALLUX LIMITUS, LEFT: ICD-10-CM

## 2017-07-13 DIAGNOSIS — M79.671 BILATERAL FOOT PAIN: Primary | ICD-10-CM

## 2017-07-13 DIAGNOSIS — M77.8 CAPSULITIS OF FOOT, RIGHT: ICD-10-CM

## 2017-07-13 DIAGNOSIS — M79.672 BILATERAL FOOT PAIN: Primary | ICD-10-CM

## 2017-07-13 DIAGNOSIS — L84 CALLUS OF FOOT: ICD-10-CM

## 2017-07-13 PROCEDURE — 99243 OFF/OP CNSLTJ NEW/EST LOW 30: CPT | Performed by: PODIATRIST

## 2017-07-13 NOTE — PROGRESS NOTES
"  ASSESSMENT/PLAN:    Encounter Diagnoses   Name Primary?     Bilateral foot pain Yes     Callus of foot      Hallux limitus, left      Arthritis, midfoot      Capsulitis of foot, right      The cause and nature of hallux limitus/1st metatarsophalangeal joint degenerative joint disease was discussed.  An informational handout was provided.      Conservative cares were reviewed:  1) Rigid-soled, supportive shoes to externally splint the joint;  2)  Avoidance of barefoot walking   3)  Activity modification  4) antiinflammatory measures such as ice, NSAIDS, and injection therapy.  We also reviewed surgical intervention.      I also explained the likely source of her right 2nd metatarsophalangeal joint pain and recommended stiffer soled shoes and orthoses with metatarsal pads.    She is to continue to file the callus down. Modification of her shoe insert is an option.  I explained that it is related to her foot structure, not a skin problem.     Right midfoot arthritis:  Custom orthoses, quality shoes    Pt is referred to the [facility] for prescription orthoses.        Body mass index is 34.64 kg/(m^2).    Weight management plan: Patient was referred to their PCP to discuss a diet and exercise plan.      Gunnar Gray DPM, FACFAS, MS    [facility]      ____________________________________________________________________    HPI:       I was asked by Kassie Benavidez NP, to evaluate this patient in consultation for bilateral foot pain, issues.  Chief Complaint: left foot \"bunion\"  Onset of problem: 1 year. She has noted more of a bump  Pain/ discomfort is described as:  Pain in certain shoes - sharp  Ratin/10   Frequency:  daily    The pain is made worse with certain shoes  Previous treatment: soaking, massage, stretching    Secondary concerns:  Other concerns.  Painful callus sub left 5th met head. She picks it out and it looks like a \"amanda.\" This " relieves pain for a period of time.   Dorsal midfoot pain on the right. Sub right 2nd metatarsophalangeal joint pain.    *  Past Medical History:   Diagnosis Date     Knee pain      Mild persistent asthma     seen at allergy and asthma, on shots     Rotator cuff disorder     right   *  *  Past Surgical History:   Procedure Laterality Date     C NONSPECIFIC PROCEDURE  1995    fx (R) 5th metatarsal  MTT or MTP joints not involved     SURGICAL HISTORY OF -   1976    tumor removed from lymph node     SURGICAL HISTORY OF -   1969    tonsillectomy   *  *  Current Outpatient Prescriptions   Medication Sig Dispense Refill     fluticasone-salmeterol (ADVAIR) 250-50 MCG/DOSE diskus inhaler Inhale 1 puff into the lungs 2 times daily 1 Inhaler 1     mometasone-formoterol (DULERA) 200-5 MCG/ACT oral inhaler Inhale 2 puffs into the lungs 2 times daily 13 g 1     budesonide-formoterol (SYMBICORT) 160-4.5 MCG/ACT Inhaler Inhale 2 puffs into the lungs 2 times daily 1 Inhaler 1     valACYclovir (VALTREX) 1000 mg tablet Take 1 tablet (1,000 mg) by mouth 3 times daily 21 tablet 0     predniSONE (DELTASONE) 20 MG tablet Take 3 tabs (60 mg) by mouth daily x 3 days, 2 tabs (40 mg) daily x 3 days, 1 tab (20 mg) daily x 3 days, then 1/2 tab (10 mg) x 3 days. 20 tablet 0     montelukast (SINGULAIR) 10 MG tablet Take 1 tablet (10 mg) by mouth At Bedtime 30 tablet 1     albuterol (ALBUTEROL) 108 (90 BASE) MCG/ACT Inhaler Inhale 2 puffs into the lungs every 4 hours as needed for shortness of breath / dyspnea or wheezing 2 Inhaler 3     predniSONE (DELTASONE) 20 MG tablet Take 1 tablet (20 mg) by mouth 2 times daily 10 tablet 0     fluticasone (FLONASE) 50 MCG/ACT spray Spray 1-2 sprays into both nostrils daily 1 Bottle 1     Multiple Vitamin (DAILY MULTIVITAMIN PO) Take 1 tablet by mouth daily Reported on 4/27/2017       Cholecalciferol (VITAMIN D) 1000 UNIT capsule Take 1 capsule by mouth daily Reported on 4/27/2017       Nutritional  Supplements (GLUCOSAMINE COMPLEX) TABS Take 1 capsule by mouth daily Reported on 4/27/2017       Calcium 7539-3509 MG-UNIT CHEW Take 2 chew tab by mouth daily Reported on 4/27/2017         ROS:     A 10-point review of systems was performed and is positive for that noted in the HPI and as seen below.  All other areas are negative.     Numbness in feet?  no   Calf pain with walking? no  Recent foot/ankle injury? no  Weight change over past 12 months? no  Self perception as overweight? yes  Recent flu-like symptoms? no  Joint pain other than feet ? Rotator cuff, knee pain    Social History: Employment:  ;  Exercise/Physical activity:  Daily walking;  Tobacco use:  no  Social History     Social History     Marital status: Single     Spouse name: N/A     Number of children: 0     Years of education: 16     Occupational History     Public health worker      Social History Main Topics     Smoking status: Never Smoker     Smokeless tobacco: Never Used     Alcohol use Yes      Comment: Very Rarely, glass of wine once a week/month     Drug use: No     Sexual activity: Yes     Partners: Female     Other Topics Concern      Service No     Blood Transfusions No     Caffeine Concern No     Occupational Exposure No     Hobby Hazards No     Sleep Concern No     Stress Concern No     Weight Concern Yes     Working on it per pt     Special Diet No     Back Care No     Exercise Yes     2 times a week     Bike Helmet Yes     Seat Belt Yes     Self-Exams Yes     Occas.     Parent/Sibling W/ Cabg, Mi Or Angioplasty Before 65f 55m? No     Social History Narrative    Caffeine intake/servings daily - 16 oz of coffee    Calcium intake/servings daily - 2    Exercise 5 times weekly - describe elyptical, biking, and walking    Sunscreen used - No    Seatbelts used - Yes    Guns stored in the home - No    Self Breast Exam - No    Pap test up to date -  Yes, as of today    Eye exam up to date -  Yes    Dental exam up to date  "-  Yes    DEXA scan up to date -  Not Applicable    Flex Sig/Colonoscopy up to date -  Not Applicable    Mammography up to date -  Yes, 06/20/2008 Neg    Immunizations reviewed and up to date - Yes, 01/2000    Abuse: Current or Past (Physical, Sexual or Emotional) - No    Do you feel safe in your environment - Yes    Do you cope well with stress - Yes    Do you suffer from insomnia - No    Last updated by: Vinnie Palencia  6/25/2008       Family history:  Family History   Problem Relation Age of Onset     C.A.D. Father      CABG at age 70     DIABETES Father      type 2 at age 60     Hypertension Father      Cardiovascular Father      tachycardia, valve problem     Lipids Father      Obesity Father      DIABETES Mother      Type 2-at age 60     Eye Disorder Mother      cataract     Obesity Mother      Respiratory Mother      lung problems from smoking-chronic cough     DIABETES Maternal Grandmother      type 2     Obesity Maternal Grandmother      DIABETES Paternal Grandmother      type 2     CANCER Paternal Grandmother      unknown type--metastisize everywhere     Obesity Paternal Grandmother      DIABETES Paternal Grandfather      type 2     Alcohol/Drug Paternal Grandfather      Obesity Paternal Grandfather      DIABETES Sister      gestational     DIABETES Brother      type 2-age 40 (2 brothers)     Alcohol/Drug Brother      Gynecology Sister      endometriosis, and unusual vag bleeding      Obesity Sister      Obesity Brother      Obesity Maternal Grandfather        Rheumatoid arthritis:  no  Foot Problems: parent - plantar fasciitis, hammer toes, bunion  Diabetes: yes      EXAM:    Vitals: /64  Pulse 74  Ht 5' 4.5\" (1.638 m)  Wt 205 lb (93 kg)  LMP 12/07/2006  BMI 34.64 kg/m2  BMI: Body mass index is 34.64 kg/(m^2).  Height: 5' 4.5\"    Constitutional/ general:  Pt is in no apparent distress, appears well-nourished.  Cooperative with history and physical exam.     Vascular:  Pedal pulses are palpable " bilaterally for both the DP and PT arteries.  CFT < 3 sec.  No edema.  Pedal hair growth noted.     Neuro:  Alert and oriented x 3. Coordinated gait.  Light touch sensation is intact to the L4, L5, S1 distributions. No obvious deficits.  No evidence of neurological-based weakness, spasticity, or contracture in the lower extremities.     Derm: Normal texture and turgor.   No open lesions. Nucleated, punctate hyperkeratotic lesion sub left 5th met head.  Erythema over the dorsal medial bump, left first met head.     Musculoskeletal:    Lower extremity muscle strength is normal.  Patient is ambulatory without an assistive device or brace .  Flatter foot structure.  Pain on palpation: sub right 2nd metatarsophalangeal joint.  I could not reproduced the right midfoot pain.  Significantly limited left 1st metatarsophalangeal joint dorsiflexion.  Prominent dorsal medial eminence, left 1st met head.       Gunnar Gray DPM, FACLEONELA, MS    [place]

## 2017-07-13 NOTE — PATIENT INSTRUCTIONS
[facility] LOCATIONS     MONDAY / FRIDAY - [place] WEDNESDAY - [place]   [address] [place] [address] [place] [address]   [phone] / [phone] / [phone]       THURSDAY - [place] SCHEDULE SURGERY: [phone]   [address] APPOINTMENTS: [phone]   [place] [address] AFTER HOURS: [phone]   [phone] / [phone] BILLING QUESTIONS: [phone]      [facility] LOCATIONS  [facility]  [address]  [place] [address]  Phone: [phone]  Fax: [phone] [facility]  [address]  [place] [address]  Phone: [phone]   Fax: [phone]   [facility]  [address]  [place] [address]  Phone: [phone]  Fax: [phone] [facility]  [address]  [place] [address]  Phone: [phone]   Fax: [phone]   [facility]   [address]  [place] [address]  Phone: [phone]   Fax: [phone] * Please call any location listed to make an appointment for a casting/fitting. Your referral was sent to their central office and they will all have the order on file.       CALLUS / CORN / IPK    When there is excessive friction or pressure on the skin, the body responds by making the skin thicker to protect the deeper structures from becoming exposed. While this works well to protect the deeper structures, the thickened skin can cause increased pressure and pain.    Callus: flat, diffuse thickened areas are simple calluses and they are usually caused by friction. Often these are the result of rubbing on a shoe or from going barefoot.    Corns: calluses with a central core on or between the toes are called corns. These result from prominent joints on toes rubbing together. Theses are a symptom of bone malalignment or illfitting shoes and will  always recur unless the underlying bones are addressed surgically.    IPK: calluses with a central core on the ball of the foot are usually IPKs (intractable plantar keratosis). These are caused by excessive pressure from the metatarsals, the bones that make up the ball of the foot. Often one of these bones is too long or too prominent. Again, these will always recur unless the underlying bone issue is addressed. There is no cure for these. They will either go away by themselves, recur, or more could develop.    Home Treatment  - File: Trim them down with a pumice stone or callus file a couple times a week to remove callus tissue that builds up. An electric callus removing device. Amope Pedi Perfect Electronic Pedicure Foot File and Callus Remover can be a good option.   - Moisturize: Lotion can be applied to soften the callus. A lactic acid or urea based cream such as Carmol, Kersal or Vanicream or thicker cream with shea butter are good options.   - Foot Gear: Good supportive shoes and minimizing barefoot walking can slow down callus formation and can decrease pain levels. Gel inserts can also provide padding to the bottom of the foot to prevent pain and slow recurrence. Toe spacers, toe covers, can custom orthotic inserts can be beneficial as well.  - Surgery: If there is a surgical pathology noted, such as a prominent bone, often this needs to be addressed surgically to minimize recurrence. However, sometimes the lesion simply migrates to another spot after surgery, so it is not a guaranteed cure.     If you cannot treat them yourself at home, call the home foot care nurses below:  [facility]  [phone] [facility]   [phone] [facility]   [phone]           CAPSULITIS / METATARSALGIA  All joints in the body are surrounded by a capsule, or a covering of soft tissue and ligaments. The capsule holds bones together and secretes joint fluid to help lubricate the joint.  If a joint capsule is exposed to  excessive force, it can develop microscopic tears and become inflamed. This commonly occurs in the foot due to mild variation in anatomy. Hammertoes, bunions, irregular bone length, joint immobility, etc. can all lead to excessive force on the joint. Capsule injury can also occur due to repetitive stress from exercise, insufficient support from shoes, excessive bare foot walking and excessive weight.      Conservative treatments include ice, rest from the aggravating activity, weight loss, orthotic inserts, improving shoes and shoe modifications. Appropriate shoes will protect the inflamed tissue improving the chances of healing. Avoidance of standing or walking barefoot, including around the house, is necessary to allow healing. Casts are sometimes used for more aggressive protection.  NSAIDs such as Advil are also used to help with pain and decreasing inflammation. If pain continues over a period of weeks with continuous rest and icing, Corticosteroid injections can be a treatment option to try and help decrease inflammation.    Surgery is often necessary to correct the underlying structural problem. Surgery might include shortening an excessively long bone, repairing bunion or hammertoe, lengthening a tight Achilles  tendon, etc. These are same day surgeries that might be pursued if more conservative measures fail to provide relief.      The inflamed joint capsule has the potential to completely tear. This will allow the toe to drift off the ground, curving toward the other toes. The involved toe may under or overlap the adjacent toes as drift continues. The pain may improve after the joint tears or this new position will be permanent. Surgery can address the toe alignment. Your goal of treating capsulitis is to avoid this scenario.          OSTEOARTHRITIS OF THE FOOT & ANKLE  Osteoarthritis is a condition characterized by the breakdown and eventual loss of cartilage in one or more joints. Cartilage (the  connective tissue found at the end of the bones in the joints) protects and cushions the bones during movement. When cartilage deteriorates or is lost, symptoms develop that can restrict one s ability to easily perform daily activities.  Osteoarthritis is also known as degenerative arthritis, reflecting its nature to develop as part of the aging process. As the most common form of arthritis, osteoarthritis affects millions of Americans. Some people refer to osteoarthritis simply as arthritis, even though there are many different types of arthritis.  Osteoarthritis appears at various joints throughout the body, including the hands, feet, spine, hips, and knees. In the foot, the disease most frequently occurs in the big toe, although it is also often found in the midfoot and ankle.  CAUSES  Osteoarthritis is considered a  wear and tear  disease because the cartilage in the joint wears down with repeated stress and use over time. As the cartilage deteriorates and gets thinner, the bones lose their protective covering and eventually may rub together, causing pain and inflammation of the joint.  An injury may also lead to osteoarthritis, although it may take months or years after the injury for the condition to develop. For example, osteoarthritis in the big toe is often caused by kicking or jamming the toe, or by dropping something on the toe. Osteoarthritis in the midfoot is often caused by dropping something on it, or by a sprain or fracture. In the ankle, osteoarthritis is usually caused by a fracture and occasionally by a severe sprain.  Sometimes osteoarthritis develops as a result of abnormal foot mechanics such as flat feet or high arches. A flat foot causes less stability in the ligaments (bands of tissue that connect bones), resulting in excessive strain on the joints, which can cause arthritis. A high arch is rigid and lacks mobility, causing a jamming of joints that creates an increased risk of  arthritis.  SYMPTOMS  People with osteoarthritis in the foot or ankle experience, in varying degrees, one or more of the following: Pain and stiffness in the joint, swelling in or near the joint, or difficulty walking or bending the joint.   Some patients with osteoarthritis also develop a bone spur (a bony protrusion) at the affected joint. Shoe pressure may cause pain at the site of a bone spur, and in some cases blisters or calluses may form over its surface. Bone spurs can also limit the movement of the joint.    DIAGNOSIS  In diagnosing osteoarthritis, the foot and ankle surgeon will examine the foot thoroughly, looking for swelling in the joint, limited mobility, and pain with movement. In some cases, deformity and/or enlargement (spur) of the joint may be noted. X-rays may be ordered to evaluate the extent of the disease.  NON-SURGICAL TREATMENT  To help relieve symptoms, the surgeon may begin treating osteoarthritis with one or more of the following non-surgical approaches:  Oral medications. Nonsteroidal anti-inflammatory drugs (NSAIDs), such as ibuprofen, are often helpful in reducing the inflammation and pain. Occasionally a prescription for a steroid medication is needed to adequately reduce symptoms.   Orthotic devices. Custom orthotic devices (shoe inserts) are often prescribed to provide support to improve the foot s mechanics or cushioning to help minimize pain.   Bracing. Bracing, which restricts motion and supports the joint, can reduce pain during walking and help prevent further deformity.   Immobilization. Protecting the foot from movement by wearing a cast or removable cast-boot may be necessary to allow the inflammation to resolve.   Steroid injections. In some cases, steroid injections are applied to the affected joint to deliver anti-inflammatory medication.   Physical therapy. Exercises to strengthen the muscles, especially when the osteoarthritis occurs in the ankle, may give the patient  "greater stability and help avoid injury that might worsen the condition.   DO I NEED SURGERY?  When osteoarthritis has progressed substantially or failed to improve with non-surgical treatment, surgery may be recommended. In advanced cases, surgery may be the only option. The goal of surgery is to decrease pain and improve function. The foot and ankle surgeon will consider a number of factors when selecting the procedure best suited to the patient s condition and lifestyle.    DEGENERATIVE ARTHRITIS OF THE BIG TOE JOINT   (hallux limitus/hallux rigidus)   Arthritis of the joint at the base of the big toe (metatarsophalangeal joint) has several causes. Usually it results from repetitive trauma to the joint, secondary to abnormal foot mechanics. Often it is hereditary. However, a one-time traumatic event can lead to arthritis. The condition doesn't improve with time, and even with treatment, can worsen. The cartilage wears out, joint surfaces are no longer smooth, bone rubs on bone, inflammation occurs with pain, and eventually bone spurs and loose fragments might develop.   The joint is often painful with activity, worse with flimsy shoes or walking barefoot, and it slowly progresses over time. A person might notice the toe \"locking up\" with walking. There often is an obvious, and irritating, bony bump on top of the foot. Shoes might be uncomfortable. In some people the pain is so bothersome that recreational activities sometimes even normal daily activities are difficult to perform.   The pain from this arthritis is likely a combination of joint jamming, cartilage loss and inflammation, and irritation from shoes rubbing on the bump. Sometimes other parts of the foot, leg, or back hurt from altering one's walk to compensate for the painful jOint.     Ways to help a person live with the discomfort include wearing a good, supportive shoe with a rigid, rocker-type bottom. An example is a hiking boot. A rigid sole " minimizes bending of the joint, and therefore, joint motion and pain. Shoes with a high toe box allow for less rubbing on the bump. Avoiding barefoot walking, sandals, flip-flops and slippers usually helps.   Sometimes an insert or orthotic provides symptom relief. This might make shoe fit more difficult. Pads over the bump and occasionally injections into the joint provide relief.   Surgery for this condition is aimed towards alleviating pain. It does not cure the arthritis nor does it guarantee better joint motion. Depending on the condition of the metatarsophalangeal joint, there are several surgiqal options:    1.  Cutting off the bony bump(s) and cleaning the joint    2.  Loosening the joint up by making cuts in the first metatarsal bone or the big toe bone and removing a small section of bone.    3.  Repositioning bone to minimize jamming of the joint.    4.  In severe cases, the joint is fused. By fusing the joint, it will never bend again. This resolves the pain, because it's the movement of a worn out jOint that causes pain. Oftentimes the operation involves a combination of these procedures and. requires the use of screws, pins, and/or a small surgical plate.     Healing after surgery requires about six weeks of protection. This allows the bone to heal. Maximum recovery takes about one year. The scar tissue and joint structures require this amount of time to finish the healing process. Expect stiffness, swelling and numbness during that time frame.   Surgery for arthritis of the metatarsophalangeal joint does involve side effects. Some side effects are predictable and others are less common but do occur. A scar will be visible and could be irritated by shoes. The shoe may rub on the screw or internal pin requiring surgical removal of these fixation devices. The screw and pin would likely be left in place for a full year. The first toe may remain stiff after surgery. The amount of stiffness is variable.  Most people never regain normal motion of the first toe. This is due to scar tissue inherent to any surgery, in addition to the cumUlative effects of arthritis. Sometimes the big toe drifts to one side or the other. Joint fusion is one option to correct an unstable, drifting toe. This procedure straightens the toe, however, no motion remains.   All surgical procedures involve risk of infection, numbness, pain, delayed bone healing, osteotomy (bone cut) dislocation, blood clots, continued foot pain, etc. Arthritic joint surgery is quite complex and should not be taken lightly.    Any skin incision can lead to infection. Deep infection might involve the bone and thus repeat surgery and six weeks of IV antibiotics. Scar tissue can cause nerve pain or numbness. his is generally temporary but can be permanent. We do not have treatments that cure nerve problems. Second toe pain could be related to altered mechanics and pressure transferred to the second toe. Delayed bone healing would lengthen the healing time. Some bones simply do not heal. This requires repeat surgery, electronic bone stimulation and/or extended protection. Smokers have an approximate 20% chance of poor bone healing. This is double that of a non-smoker. The bone cut may displace. This may need to be repaired with a second operation. Displacement can cause joint malalignment. Immobility after surgery can cause a blood clot in the legs and lungs. This could result in death.   Foot pain is complex. Most feet hurt for more than one reason. Operating on the arthritic   big toe joint will not necessarily create a pain free foot. Appropriate shoes, healthy body weight, avoidance of bare foot walking and moderation of activity will always be   necessary to enjoy foot comfort. Arthritis is incurable even with surgery.     Surgery for this type of arthritis is nevertheless quite successful. Most surgical patients are pleased with their foot following surgery.  Many of the issues described above can be controlled by taking proper care of your foot during the healing process.   Cosmetic bump surgery is discouraged for the reasons listed above. A bump and joint that is comfortable when wearing appropriate shoes should simply be treated with appropriate shoes.   Your surgeon would be happy to fully describe any of the above issues. You should pursue a full understanding of the operation, recovery process and any potential problems that could develop.       Body Mass Index (BMI)  Many things can cause foot and ankle problems. Foot structure, activity level, foot mechanics and injuries are common causes of pain.  One very important issue that often goes unmentioned, is body weight.  Extra weight can cause increased stress on muscles, ligaments, bones and tendons.  Sometimes just a few extra pounds is all it takes to put one over her/his threshold. Without reducing that stress, it can be difficult to alleviate pain. Some people are uncomfortable addressing this issue, but we feel it is important for you to think about it. As Foot &  Ankle specialists, our job is addressing the lower extremity problem and possible causes. Regarding extra body weight, we encourage patients to discuss diet and weight management plans with their primary care doctors. It is this team approach that gives you the best opportunity for pain relief and getting you back on your feet.

## 2017-07-13 NOTE — NURSING NOTE
"Chief Complaint   Patient presents with     Consult     bilateral foot pain x 1yr / no injury getting worse       Initial /64  Pulse 74  Ht 5' 4.5\" (1.638 m)  Wt 205 lb (93 kg)  LMP 12/07/2006  BMI 34.64 kg/m2 Estimated body mass index is 34.64 kg/(m^2) as calculated from the following:    Height as of this encounter: 5' 4.5\" (1.638 m).    Weight as of this encounter: 205 lb (93 kg).  Medication Reconciliation: complete    "

## 2017-07-13 NOTE — MR AVS SNAPSHOT
After Visit Summary   7/13/2017    Ofelia Valenzuela    MRN: [id number]           Patient Information     Date Of Birth          1960        Visit Information        Provider Department      7/13/2017 8:15 AM Gunnar Gray DPM [facility]        Care Instructions    DR. GRAY'S CLINIC LOCATIONS     MONDAY / FRIDAY - [facility] WEDNESDAY - [place]   [address] [place] [address] [place] [address]   [phone] / [phone] / [phone]       [facility]: [phone]   [address] APPOINTMENTS: [phone]   [place] [address] AFTER HOURS: [phone]   [phone] / [phone] BILLING QUESTIONS: [phone]      [facility] LOCATIONS  [facility]  [address]  [place] [address]  Phone: [phone]  Fax: [phone] [facility]  [address]  [place] [address]  Phone: [phone]   Fax: [phone]   [facility]  [address]  [place] [address]  Phone: [phone]  Fax: [phone] [facility]  [address]  [place] [address]  Phone: [phone]   Fax: [phone]   [facility]   [address]  [place] [address]  Phone: [phone]   Fax: [phone] * Please call any location listed to make an appointment for a casting/fitting. Your referral was sent to their central office and they will all have the order on file.       CALLUS / CORN / IPK    When there is excessive friction or pressure on the skin, the body responds by making the skin thicker to protect the deeper structures from becoming exposed. While this works well to protect the deeper structures, the thickened skin can cause increased pressure and pain.    Callus: flat, diffuse thickened areas are simple calluses and they are  usually caused by friction. Often these are the result of rubbing on a shoe or from going barefoot.    Corns: calluses with a central core on or between the toes are called corns. These result from prominent joints on toes rubbing together. Theses are a symptom of bone malalignment or illfitting shoes and will always recur unless the underlying bones are addressed surgically.    IPK: calluses with a central core on the ball of the foot are usually IPKs (intractable plantar keratosis). These are caused by excessive pressure from the metatarsals, the bones that make up the ball of the foot. Often one of these bones is too long or too prominent. Again, these will always recur unless the underlying bone issue is addressed. There is no cure for these. They will either go away by themselves, recur, or more could develop.    Home Treatment  - File: Trim them down with a pumice stone or callus file a couple times a week to remove callus tissue that builds up. An electric callus removing device. Amope Pedi Perfect Electronic Pedicure Foot File and Callus Remover can be a good option.   - Moisturize: Lotion can be applied to soften the callus. A lactic acid or urea based cream such as Carmol, Kersal or Vanicream or thicker cream with shea butter are good options.   - Foot Gear: Good supportive shoes and minimizing barefoot walking can slow down callus formation and can decrease pain levels. Gel inserts can also provide padding to the bottom of the foot to prevent pain and slow recurrence. Toe spacers, toe covers, can custom orthotic inserts can be beneficial as well.  - Surgery: If there is a surgical pathology noted, such as a prominent bone, often this needs to be addressed surgically to minimize recurrence. However, sometimes the lesion simply migrates to another spot after surgery, so it is not a guaranteed cure.     If you cannot treat them yourself at home, call the home foot care nurses below:  [facility]  [phone]  [facility]   [phone] [facility]   [phone]           CAPSULITIS / METATARSALGIA  All joints in the body are surrounded by a capsule, or a covering of soft tissue and ligaments. The capsule holds bones together and secretes joint fluid to help lubricate the joint.  If a joint capsule is exposed to excessive force, it can develop microscopic tears and become inflamed. This commonly occurs in the foot due to mild variation in anatomy. Hammertoes, bunions, irregular bone length, joint immobility, etc. can all lead to excessive force on the joint. Capsule injury can also occur due to repetitive stress from exercise, insufficient support from shoes, excessive bare foot walking and excessive weight.      Conservative treatments include ice, rest from the aggravating activity, weight loss, orthotic inserts, improving shoes and shoe modifications. Appropriate shoes will protect the inflamed tissue improving the chances of healing. Avoidance of standing or walking barefoot, including around the house, is necessary to allow healing. Casts are sometimes used for more aggressive protection.  NSAIDs such as Advil are also used to help with pain and decreasing inflammation. If pain continues over a period of weeks with continuous rest and icing, Corticosteroid injections can be a treatment option to try and help decrease inflammation.    Surgery is often necessary to correct the underlying structural problem. Surgery might include shortening an excessively long bone, repairing bunion or hammertoe, lengthening a tight Achilles  tendon, etc. These are same day surgeries that might be pursued if more conservative measures fail to provide relief.      The inflamed joint capsule has the potential to completely tear. This will allow the toe to drift off the ground, curving toward the other toes. The involved toe may under or overlap the adjacent toes as drift continues. The pain may improve after the joint tears or this new  position will be permanent. Surgery can address the toe alignment. Your goal of treating capsulitis is to avoid this scenario.          OSTEOARTHRITIS OF THE FOOT & ANKLE  Osteoarthritis is a condition characterized by the breakdown and eventual loss of cartilage in one or more joints. Cartilage (the connective tissue found at the end of the bones in the joints) protects and cushions the bones during movement. When cartilage deteriorates or is lost, symptoms develop that can restrict one s ability to easily perform daily activities.  Osteoarthritis is also known as degenerative arthritis, reflecting its nature to develop as part of the aging process. As the most common form of arthritis, osteoarthritis affects millions of Americans. Some people refer to osteoarthritis simply as arthritis, even though there are many different types of arthritis.  Osteoarthritis appears at various joints throughout the body, including the hands, feet, spine, hips, and knees. In the foot, the disease most frequently occurs in the big toe, although it is also often found in the midfoot and ankle.  CAUSES  Osteoarthritis is considered a  wear and tear  disease because the cartilage in the joint wears down with repeated stress and use over time. As the cartilage deteriorates and gets thinner, the bones lose their protective covering and eventually may rub together, causing pain and inflammation of the joint.  An injury may also lead to osteoarthritis, although it may take months or years after the injury for the condition to develop. For example, osteoarthritis in the big toe is often caused by kicking or jamming the toe, or by dropping something on the toe. Osteoarthritis in the midfoot is often caused by dropping something on it, or by a sprain or fracture. In the ankle, osteoarthritis is usually caused by a fracture and occasionally by a severe sprain.  Sometimes osteoarthritis develops as a result of abnormal foot mechanics such as  flat feet or high arches. A flat foot causes less stability in the ligaments (bands of tissue that connect bones), resulting in excessive strain on the joints, which can cause arthritis. A high arch is rigid and lacks mobility, causing a jamming of joints that creates an increased risk of arthritis.  SYMPTOMS  People with osteoarthritis in the foot or ankle experience, in varying degrees, one or more of the following: Pain and stiffness in the joint, swelling in or near the joint, or difficulty walking or bending the joint.   Some patients with osteoarthritis also develop a bone spur (a bony protrusion) at the affected joint. Shoe pressure may cause pain at the site of a bone spur, and in some cases blisters or calluses may form over its surface. Bone spurs can also limit the movement of the joint.    DIAGNOSIS  In diagnosing osteoarthritis, the foot and ankle surgeon will examine the foot thoroughly, looking for swelling in the joint, limited mobility, and pain with movement. In some cases, deformity and/or enlargement (spur) of the joint may be noted. X-rays may be ordered to evaluate the extent of the disease.  NON-SURGICAL TREATMENT  To help relieve symptoms, the surgeon may begin treating osteoarthritis with one or more of the following non-surgical approaches:  Oral medications. Nonsteroidal anti-inflammatory drugs (NSAIDs), such as ibuprofen, are often helpful in reducing the inflammation and pain. Occasionally a prescription for a steroid medication is needed to adequately reduce symptoms.   Orthotic devices. Custom orthotic devices (shoe inserts) are often prescribed to provide support to improve the foot s mechanics or cushioning to help minimize pain.   Bracing. Bracing, which restricts motion and supports the joint, can reduce pain during walking and help prevent further deformity.   Immobilization. Protecting the foot from movement by wearing a cast or removable cast-boot may be necessary to allow the  "inflammation to resolve.   Steroid injections. In some cases, steroid injections are applied to the affected joint to deliver anti-inflammatory medication.   Physical therapy. Exercises to strengthen the muscles, especially when the osteoarthritis occurs in the ankle, may give the patient greater stability and help avoid injury that might worsen the condition.   DO I NEED SURGERY?  When osteoarthritis has progressed substantially or failed to improve with non-surgical treatment, surgery may be recommended. In advanced cases, surgery may be the only option. The goal of surgery is to decrease pain and improve function. The foot and ankle surgeon will consider a number of factors when selecting the procedure best suited to the patient s condition and lifestyle.    DEGENERATIVE ARTHRITIS OF THE BIG TOE JOINT   (hallux limitus/hallux rigidus)   Arthritis of the joint at the base of the big toe (metatarsophalangeal joint) has several causes. Usually it results from repetitive trauma to the joint, secondary to abnormal foot mechanics. Often it is hereditary. However, a one-time traumatic event can lead to arthritis. The condition doesn't improve with time, and even with treatment, can worsen. The cartilage wears out, joint surfaces are no longer smooth, bone rubs on bone, inflammation occurs with pain, and eventually bone spurs and loose fragments might develop.   The joint is often painful with activity, worse with flimsy shoes or walking barefoot, and it slowly progresses over time. A person might notice the toe \"locking up\" with walking. There often is an obvious, and irritating, bony bump on top of the foot. Shoes might be uncomfortable. In some people the pain is so bothersome that recreational activities sometimes even normal daily activities are difficult to perform.   The pain from this arthritis is likely a combination of joint jamming, cartilage loss and inflammation, and irritation from shoes rubbing on the " bump. Sometimes other parts of the foot, leg, or back hurt from altering one's walk to compensate for the painful jOint.     Ways to help a person live with the discomfort include wearing a good, supportive shoe with a rigid, rocker-type bottom. An example is a hiking boot. A rigid sole minimizes bending of the joint, and therefore, joint motion and pain. Shoes with a high toe box allow for less rubbing on the bump. Avoiding barefoot walking, sandals, flip-flops and slippers usually helps.   Sometimes an insert or orthotic provides symptom relief. This might make shoe fit more difficult. Pads over the bump and occasionally injections into the joint provide relief.   Surgery for this condition is aimed towards alleviating pain. It does not cure the arthritis nor does it guarantee better joint motion. Depending on the condition of the metatarsophalangeal joint, there are several surgiqal options:    1.  Cutting off the bony bump(s) and cleaning the joint    2.  Loosening the joint up by making cuts in the first metatarsal bone or the big toe bone and removing a small section of bone.    3.  Repositioning bone to minimize jamming of the joint.    4.  In severe cases, the joint is fused. By fusing the joint, it will never bend again. This resolves the pain, because it's the movement of a worn out jOint that causes pain. Oftentimes the operation involves a combination of these procedures and. requires the use of screws, pins, and/or a small surgical plate.     Healing after surgery requires about six weeks of protection. This allows the bone to heal. Maximum recovery takes about one year. The scar tissue and joint structures require this amount of time to finish the healing process. Expect stiffness, swelling and numbness during that time frame.   Surgery for arthritis of the metatarsophalangeal joint does involve side effects. Some side effects are predictable and others are less common but do occur. A scar will be  visible and could be irritated by shoes. The shoe may rub on the screw or internal pin requiring surgical removal of these fixation devices. The screw and pin would likely be left in place for a full year. The first toe may remain stiff after surgery. The amount of stiffness is variable. Most people never regain normal motion of the first toe. This is due to scar tissue inherent to any surgery, in addition to the cumUlative effects of arthritis. Sometimes the big toe drifts to one side or the other. Joint fusion is one option to correct an unstable, drifting toe. This procedure straightens the toe, however, no motion remains.   All surgical procedures involve risk of infection, numbness, pain, delayed bone healing, osteotomy (bone cut) dislocation, blood clots, continued foot pain, etc. Arthritic joint surgery is quite complex and should not be taken lightly.    Any skin incision can lead to infection. Deep infection might involve the bone and thus repeat surgery and six weeks of IV antibiotics. Scar tissue can cause nerve pain or numbness. his is generally temporary but can be permanent. We do not have treatments that cure nerve problems. Second toe pain could be related to altered mechanics and pressure transferred to the second toe. Delayed bone healing would lengthen the healing time. Some bones simply do not heal. This requires repeat surgery, electronic bone stimulation and/or extended protection. Smokers have an approximate 20% chance of poor bone healing. This is double that of a non-smoker. The bone cut may displace. This may need to be repaired with a second operation. Displacement can cause joint malalignment. Immobility after surgery can cause a blood clot in the legs and lungs. This could result in death.   Foot pain is complex. Most feet hurt for more than one reason. Operating on the arthritic   big toe joint will not necessarily create a pain free foot. Appropriate shoes, healthy body weight,  avoidance of bare foot walking and moderation of activity will always be   necessary to enjoy foot comfort. Arthritis is incurable even with surgery.     Surgery for this type of arthritis is nevertheless quite successful. Most surgical patients are pleased with their foot following surgery. Many of the issues described above can be controlled by taking proper care of your foot during the healing process.   Cosmetic bump surgery is discouraged for the reasons listed above. A bump and joint that is comfortable when wearing appropriate shoes should simply be treated with appropriate shoes.   Your surgeon would be happy to fully describe any of the above issues. You should pursue a full understanding of the operation, recovery process and any potential problems that could develop.       Body Mass Index (BMI)  Many things can cause foot and ankle problems. Foot structure, activity level, foot mechanics and injuries are common causes of pain.  One very important issue that often goes unmentioned, is body weight.  Extra weight can cause increased stress on muscles, ligaments, bones and tendons.  Sometimes just a few extra pounds is all it takes to put one over her/his threshold. Without reducing that stress, it can be difficult to alleviate pain. Some people are uncomfortable addressing this issue, but we feel it is important for you to think about it. As Foot &  Ankle specialists, our job is addressing the lower extremity problem and possible causes. Regarding extra body weight, we encourage patients to discuss diet and weight management plans with their primary care doctors. It is this team approach that gives you the best opportunity for pain relief and getting you back on your feet.                Follow-ups after your visit        Your next 10 appointments already scheduled     Aug 14, 2017  7:00 AM CDT   FULL PULMONARY FUNCTION with PRABHA PFL A   [facility] Pulmonary Function Testing ([facility])  "   [address] [id number] [phone]            Aug 14, 2017  8:00 AM CDT   (Arrive by 7:45 AM)   New Asthma Visit with Jenae May MD   [facility] ([facility])    [address] [id number] [phone]              Who to contact     If you have questions or need follow up information about today's clinic visit or your schedule please contact [facility] directly at [phone].  Normal or non-critical lab and imaging results will be communicated to you by MyChart, letter or phone within 4 business days after the clinic has received the results. If you do not hear from us within 7 days, please contact the clinic through [facility] or phone. If you have a critical or abnormal lab result, we will notify you by phone as soon as possible.  Submit refill requests through [facility] or call your pharmacy and they will forward the refill request to us. Please allow 3 business days for your refill to be completed.          Additional Information About Your Visit        [facility] Information     [facility] gives you secure access to your electronic health record. If you see a primary care provider, you can also send messages to your care team and make appointments. If you have questions, please call your primary care clinic.  If you do not have a primary care provider, please call [phone] and they will assist you.        Care EveryWhere ID     This is your Care EveryWhere ID. This could be used by other organizations to access your [place] medical records  [id number]        Your Vitals Were     Pulse Height Last Period BMI (Body Mass Index)          74 5' 4.5\" (1.638 m) 12/07/2006 34.64 kg/m2         Blood Pressure from Last 3 Encounters:   07/13/17 108/64   04/27/17 122/74   04/06/17 129/83    Weight from Last 3 Encounters:   07/13/17 205 lb (93 kg)   04/27/17 216 " lb (98 kg)   04/06/17 209 lb (94.8 kg)              Today, you had the following     No orders found for display       Primary Care Provider Office Phone # Fax #    ZEB Cox [phone]       [facility] [address]  [facility] [address]        Equal Access to Services     [facility] : Hadii aad ku hadasho Soomaali, waaxda luqadaha, qaybta kaalmada adeegyada, waxay idiin hayaan adeeg kharash la'aajames . So [facility] [phone].    ATENCIÓN: Si habla español, tiene a loera disposición servicios gratuitos de asistencia lingüística. Lauraame al [phone].    We comply with applicable federal civil rights laws and [place] laws. We do not discriminate on the basis of race, color, national origin, age, disability sex, sexual orientation or gender identity.            Thank you!     Thank you for choosing [facility]  for your care. Our goal is always to provide you with excellent care. Hearing back from our patients is one way we can continue to improve our services. Please take a few minutes to complete the written survey that you may receive in the mail after your visit with us. Thank you!             Your Updated Medication List - Protect others around you: Learn how to safely use, store and throw away your medicines at www.disposemymeds.org.          This list is accurate as of: 7/13/17  8:50 AM.  Always use your most recent med list.                   Brand Name Dispense Instructions for use Diagnosis    albuterol 108 (90 BASE) MCG/ACT Inhaler    albuterol    2 Inhaler    Inhale 2 puffs into the lungs every 4 hours as needed for shortness of breath / dyspnea or wheezing    Mild persistent asthma without complication       budesonide-formoterol 160-4.5 MCG/ACT Inhaler    SYMBICORT    1 Inhaler    Inhale 2 puffs into the lungs 2 times daily    Mild persistent asthma with acute exacerbation       Calcium [id number] MG-UNIT Chew      Take 2 chew tab by mouth  daily Reported on 4/27/2017    Multinodular goiter       DAILY MULTIVITAMIN PO      Take 1 tablet by mouth daily Reported on 4/27/2017    Multinodular goiter       fluticasone 50 MCG/ACT spray    FLONASE    1 Bottle    Spray 1-2 sprays into both nostrils daily    Post-nasal drip       fluticasone-salmeterol 250-50 MCG/DOSE diskus inhaler    ADVAIR    1 Inhaler    Inhale 1 puff into the lungs 2 times daily    Mild persistent asthma with acute exacerbation       glucosamine complex Tabs      Take 1 capsule by mouth daily Reported on 4/27/2017    Multinodular goiter       mometasone-formoterol 200-5 MCG/ACT oral inhaler    DULERA    13 g    Inhale 2 puffs into the lungs 2 times daily    Mild persistent asthma with acute exacerbation       montelukast 10 MG tablet    SINGULAIR    30 tablet    Take 1 tablet (10 mg) by mouth At Bedtime    Asthma, persistent not controlled       * predniSONE 20 MG tablet    DELTASONE    10 tablet    Take 1 tablet (20 mg) by mouth 2 times daily    Mild persistent asthma without complication       * predniSONE 20 MG tablet    DELTASONE    20 tablet    Take 3 tabs (60 mg) by mouth daily x 3 days, 2 tabs (40 mg) daily x 3 days, 1 tab (20 mg) daily x 3 days, then 1/2 tab (10 mg) x 3 days.    Mild persistent asthma with acute exacerbation       valACYclovir 1000 mg tablet    VALTREX    21 tablet    Take 1 tablet (1,000 mg) by mouth 3 times daily    Herpes zoster without complication       vitamin D 1000 UNITS capsule      Take 1 capsule by mouth daily Reported on 4/27/2017    Multinodular goiter       * Notice:  This list has 2 medication(s) that are the same as other medications prescribed for you. Read the directions carefully, and ask your doctor or other care provider to review them with you.

## 2017-07-13 NOTE — LETTER
"      7/13/2017         RE: Ofelia Valenzuela  [address] [place] [id number]        Dear Colleague,    Thank you for referring your patient, Ofelia Valenzuela, to the [facility]. Please see a copy of my visit note below.      ASSESSMENT/PLAN:    Encounter Diagnoses   Name Primary?     Bilateral foot pain Yes     Callus of foot      Hallux limitus, left      Arthritis, midfoot      Capsulitis of foot, right      The cause and nature of hallux limitus/1st metatarsophalangeal joint degenerative joint disease was discussed.  An informational handout was provided.      Conservative cares were reviewed:  1) Rigid-soled, supportive shoes to externally splint the joint;  2)  Avoidance of barefoot walking   3)  Activity modification  4) antiinflammatory measures such as ice, NSAIDS, and injection therapy.  We also reviewed surgical intervention.      I also explained the likely source of her right 2nd metatarsophalangeal joint pain and recommended stiffer soled shoes and orthoses with metatarsal pads.    She is to continue to file the callus down. Modification of her shoe insert is an option.  I explained that it is related to her foot structure, not a skin problem.     Right midfoot arthritis:  Custom orthoses, quality shoes    Pt is referred to the [facility] for prescription orthoses.        Body mass index is 34.64 kg/(m^2).    Weight management plan: Patient was referred to their PCP to discuss a diet and exercise plan.      Gunnar Gray DPM, FACFAS, MS    [facility]      ____________________________________________________________________    HPI:       I was asked by Kassie Benavidez NP, to evaluate this patient in consultation for bilateral foot pain, issues.  Chief Complaint: left foot \"bunion\"  Onset of problem: 1 year. She has noted more of a bump  Pain/ discomfort is described as:  Pain in certain shoes - " "sharp  Ratin/10   Frequency:  daily    The pain is made worse with certain shoes  Previous treatment: soaking, massage, stretching    Secondary concerns:  Other concerns.  Painful callus sub left 5th met head. She picks it out and it looks like a \"amanda.\" This relieves pain for a period of time.   Dorsal midfoot pain on the right. Sub right 2nd metatarsophalangeal joint pain.    *  Past Medical History:   Diagnosis Date     Knee pain      Mild persistent asthma     seen at allergy and asthma, on shots     Rotator cuff disorder     right   *  *  Past Surgical History:   Procedure Laterality Date     C NONSPECIFIC PROCEDURE      fx (R) 5th metatarsal  MTT or MTP joints not involved     SURGICAL HISTORY OF -       tumor removed from lymph node     SURGICAL HISTORY OF -       tonsillectomy   *  *  Current Outpatient Prescriptions   Medication Sig Dispense Refill     fluticasone-salmeterol (ADVAIR) 250-50 MCG/DOSE diskus inhaler Inhale 1 puff into the lungs 2 times daily 1 Inhaler 1     mometasone-formoterol (DULERA) 200-5 MCG/ACT oral inhaler Inhale 2 puffs into the lungs 2 times daily 13 g 1     budesonide-formoterol (SYMBICORT) 160-4.5 MCG/ACT Inhaler Inhale 2 puffs into the lungs 2 times daily 1 Inhaler 1     valACYclovir (VALTREX) 1000 mg tablet Take 1 tablet (1,000 mg) by mouth 3 times daily 21 tablet 0     predniSONE (DELTASONE) 20 MG tablet Take 3 tabs (60 mg) by mouth daily x 3 days, 2 tabs (40 mg) daily x 3 days, 1 tab (20 mg) daily x 3 days, then 1/2 tab (10 mg) x 3 days. 20 tablet 0     montelukast (SINGULAIR) 10 MG tablet Take 1 tablet (10 mg) by mouth At Bedtime 30 tablet 1     albuterol (ALBUTEROL) 108 (90 BASE) MCG/ACT Inhaler Inhale 2 puffs into the lungs every 4 hours as needed for shortness of breath / dyspnea or wheezing 2 Inhaler 3     predniSONE (DELTASONE) 20 MG tablet Take 1 tablet (20 mg) by mouth 2 times daily 10 tablet 0     fluticasone (FLONASE) 50 MCG/ACT spray Spray 1-2 " sprays into both nostrils daily 1 Bottle 1     Multiple Vitamin (DAILY MULTIVITAMIN PO) Take 1 tablet by mouth daily Reported on 4/27/2017       Cholecalciferol (VITAMIN D) 1000 UNIT capsule Take 1 capsule by mouth daily Reported on 4/27/2017       Nutritional Supplements (GLUCOSAMINE COMPLEX) TABS Take 1 capsule by mouth daily Reported on 4/27/2017       Calcium 5028-3477 MG-UNIT CHEW Take 2 chew tab by mouth daily Reported on 4/27/2017         ROS:     A 10-point review of systems was performed and is positive for that noted in the HPI and as seen below.  All other areas are negative.     Numbness in feet?  no   Calf pain with walking? no  Recent foot/ankle injury? no  Weight change over past 12 months? no  Self perception as overweight? yes  Recent flu-like symptoms? no  Joint pain other than feet ? Rotator cuff, knee pain    Social History: Employment:  ;  Exercise/Physical activity:  Daily walking;  Tobacco use:  no  Social History     Social History     Marital status: Single     Spouse name: N/A     Number of children: 0     Years of education: 16     Occupational History     Public health worker      Social History Main Topics     Smoking status: Never Smoker     Smokeless tobacco: Never Used     Alcohol use Yes      Comment: Very Rarely, glass of wine once a week/month     Drug use: No     Sexual activity: Yes     Partners: Female     Other Topics Concern      Service No     Blood Transfusions No     Caffeine Concern No     Occupational Exposure No     Hobby Hazards No     Sleep Concern No     Stress Concern No     Weight Concern Yes     Working on it per pt     Special Diet No     Back Care No     Exercise Yes     2 times a week     Bike Helmet Yes     Seat Belt Yes     Self-Exams Yes     Occas.     Parent/Sibling W/ Cabg, Mi Or Angioplasty Before 65f 55m? No     Social History Narrative    Caffeine intake/servings daily - 16 oz of coffee    Calcium intake/servings daily - 2     "Exercise 5 times weekly - describe elyptical, biking, and walking    Sunscreen used - No    Seatbelts used - Yes    Guns stored in the home - No    Self Breast Exam - No    Pap test up to date -  Yes, as of today    Eye exam up to date -  Yes    Dental exam up to date -  Yes    DEXA scan up to date -  Not Applicable    Flex Sig/Colonoscopy up to date -  Not Applicable    Mammography up to date -  Yes, 06/20/2008 Neg    Immunizations reviewed and up to date - Yes, 01/2000    Abuse: Current or Past (Physical, Sexual or Emotional) - No    Do you feel safe in your environment - Yes    Do you cope well with stress - Yes    Do you suffer from insomnia - No    Last updated by: Vinnie Palencia  6/25/2008       Family history:  Family History   Problem Relation Age of Onset     C.A.D. Father      CABG at age 70     DIABETES Father      type 2 at age 60     Hypertension Father      Cardiovascular Father      tachycardia, valve problem     Lipids Father      Obesity Father      DIABETES Mother      Type 2-at age 60     Eye Disorder Mother      cataract     Obesity Mother      Respiratory Mother      lung problems from smoking-chronic cough     DIABETES Maternal Grandmother      type 2     Obesity Maternal Grandmother      DIABETES Paternal Grandmother      type 2     CANCER Paternal Grandmother      unknown type--metastisize everywhere     Obesity Paternal Grandmother      DIABETES Paternal Grandfather      type 2     Alcohol/Drug Paternal Grandfather      Obesity Paternal Grandfather      DIABETES Sister      gestational     DIABETES Brother      type 2-age 40 (2 brothers)     Alcohol/Drug Brother      Gynecology Sister      endometriosis, and unusual vag bleeding      Obesity Sister      Obesity Brother      Obesity Maternal Grandfather        Rheumatoid arthritis:  no  Foot Problems: parent - plantar fasciitis, hammer toes, bunion  Diabetes: yes      EXAM:    Vitals: /64  Pulse 74  Ht 5' 4.5\" (1.638 m)  Wt 205 lb (93 kg) " " LMP 12/07/2006  BMI 34.64 kg/m2  BMI: Body mass index is 34.64 kg/(m^2).  Height: 5' 4.5\"    Constitutional/ general:  Pt is in no apparent distress, appears well-nourished.  Cooperative with history and physical exam.     Vascular:  Pedal pulses are palpable bilaterally for both the DP and PT arteries.  CFT < 3 sec.  No edema.  Pedal hair growth noted.     Neuro:  Alert and oriented x 3. Coordinated gait.  Light touch sensation is intact to the L4, L5, S1 distributions. No obvious deficits.  No evidence of neurological-based weakness, spasticity, or contracture in the lower extremities.     Derm: Normal texture and turgor.   No open lesions. Nucleated, punctate hyperkeratotic lesion sub left 5th met head.  Erythema over the dorsal medial bump, left first met head.     Musculoskeletal:    Lower extremity muscle strength is normal.  Patient is ambulatory without an assistive device or brace .  Flatter foot structure.  Pain on palpation: sub right 2nd metatarsophalangeal joint.  I could not reproduced the right midfoot pain.  Significantly limited left 1st metatarsophalangeal joint dorsiflexion.  Prominent dorsal medial eminence, left 1st met head.       Gunnar Gray DPM, FACFAS, MS    [facility]                Again, thank you for allowing me to participate in the care of your patient.        Sincerely,        Gunnar Gray DPM    "

## 2017-08-04 ENCOUNTER — PRE VISIT (OUTPATIENT)
Dept: PULMONOLOGY | Facility: CLINIC | Age: 57
End: 2017-08-04

## 2017-08-04 NOTE — TELEPHONE ENCOUNTER
1.  Date/reason for appt:8/14/17, Asthma   2.  Referring provider: PARUL GRIGSBY  3.  Call to patient (Yes / No - short description): No, referred   4.  Previous care at / records requested from:   [facility], [place]

## 2017-08-13 ASSESSMENT — ENCOUNTER SYMPTOMS
WHEEZING: 0
SPUTUM PRODUCTION: 0
POSTURAL DYSPNEA: 0
COUGH: 0
SHORTNESS OF BREATH: 0
DYSPNEA ON EXERTION: 1
HEMOPTYSIS: 0
COUGH DISTURBING SLEEP: 0
SNORES LOUDLY: 0
RESPIRATORY PAIN: 0

## 2017-08-14 ENCOUNTER — OFFICE VISIT (OUTPATIENT)
Dept: PULMONOLOGY | Facility: CLINIC | Age: 57
End: 2017-08-14
Attending: INTERNAL MEDICINE
Payer: COMMERCIAL

## 2017-08-14 VITALS
OXYGEN SATURATION: 96 % | HEART RATE: 70 BPM | SYSTOLIC BLOOD PRESSURE: 135 MMHG | RESPIRATION RATE: 20 BRPM | DIASTOLIC BLOOD PRESSURE: 86 MMHG

## 2017-08-14 DIAGNOSIS — J45.31 MILD PERSISTENT ASTHMA WITH ACUTE EXACERBATION: ICD-10-CM

## 2017-08-14 DIAGNOSIS — R05.9 COUGH: ICD-10-CM

## 2017-08-14 DIAGNOSIS — J45.31 MILD PERSISTENT ASTHMA WITH ACUTE EXACERBATION: Primary | ICD-10-CM

## 2017-08-14 DIAGNOSIS — J45.30 MILD PERSISTENT ASTHMA WITHOUT COMPLICATION: ICD-10-CM

## 2017-08-14 PROCEDURE — 99212 OFFICE O/P EST SF 10 MIN: CPT

## 2017-08-14 RX ORDER — PREDNISONE 20 MG/1
40 TABLET ORAL DAILY
Qty: 10 TABLET | Refills: 1 | Status: SHIPPED | OUTPATIENT
Start: 2017-08-14 | End: 2017-12-11

## 2017-08-14 RX ORDER — FLUTICASONE PROPIONATE 110 UG/1
1 AEROSOL, METERED RESPIRATORY (INHALATION) 2 TIMES DAILY
Qty: 1 INHALER | Refills: 11 | Status: SHIPPED | OUTPATIENT
Start: 2017-08-14

## 2017-08-14 RX ORDER — ALBUTEROL SULFATE 90 UG/1
2 AEROSOL, METERED RESPIRATORY (INHALATION) EVERY 4 HOURS PRN
Qty: 1 INHALER | Refills: 3 | Status: SHIPPED | OUTPATIENT
Start: 2017-08-14

## 2017-08-14 ASSESSMENT — PAIN SCALES - GENERAL: PAINLEVEL: NO PAIN (0)

## 2017-08-14 NOTE — NURSING NOTE
Chief Complaint   Patient presents with     Consult     Patient is here for an asthma consult        Anne Pabon CMA at 7:54 AM on 8/14/2017

## 2017-08-14 NOTE — PROGRESS NOTES
"CHIEF COMPLAINT:  Asthma.      REFERRING PROVIDER:  ZEB Hahn.       HISTORY OF PRESENT ILLNESS:  The patient is a 56-year-old woman here for evaluation of asthma.  She states that she has a longstanding history of asthma, initially diagnosed in the mid-80s.  At that time she had extreme difficulty breathing with exposure to cold air.  She went to urgent care and was diagnosed with asthma.  She says that since then, she has had issues with her breathing, mostly in the spring and fall.  She was referred to an allergist and was diagnosed with sensitivity to dust, dogs and cats.  She underwent desensitization shots and was prescribed fluticasone inhaler which she took primarily in the spring and fall.  She says that she normally does not have much in the way of respiratory symptoms, but does occasionally wheeze when she is doing what she describes as heavy activity.  She says that in the last couple of years she has been quite lax with her asthma treatment.      She says that this spring she developed a respiratory infection that settled into her chest.  She describes it as \"crud.\"  She says that at the time she had symptoms lingered for much longer than she expected.  She was initially given a course of prednisone for 5 days with incomplete resolution of symptoms and was subsequently given a longer course of steroids which eventually improved her breathing.  She describes the \"crud\" as a cough productive of sputum typically white to yellow.  At that time she was also waking up coughing.  She says that typically when she gets an upper respiratory infection, it does settle in her chest and cause difficulty with her breathing.  At the time of the symptoms last April she was prescribed montelukast, but did not think it changed her symptoms.  She says that she occasionally uses albuterol which does help her breathing, but it takes a little bit longer than she expects for it to help.  She has a spacer that she was " initially prescribed about 10 years ago.  She says that oftentimes when she gets an upper respiratory infection.  She also uses a Neti pot for saline irrigation.      When asked about why she does not use her fluticasone consistently, she says that mostly she just would prefer not to take medications.  She is concerned about the systemic side effects of steroids.  She says that she does not recall having any significant adverse effects from using a fluticasone in the past.  At her best she does not have limitations with her breathing, at her worst she needs to take albuterol more consistently.  She has a peak flow meter but has not used for many years.  She describes her worst time of the year is spring and fall.  She does occasionally cough when going out into the cold, but most of the time she uses a mask or scarf over her face to help her breathing.      PAST MEDICAL HISTORY:   1.  Asthma.   2.  Multinodular goiter      FAMILY HISTORY:  She has a sister with asthma.  Father had coronary artery disease.  Mother  at age 66 after a seizure.  She had been having seizures after a traumatic brain injury.      SOCIAL HISTORY:  Patient is a lifetime nonsmoker.  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  The patient works here doing this only part-time.  She does not have any pets.  They have hardwood floors in [facility] home.  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      REVIEW OF SYSTEMS:  A full 14-point review of systems was done and is negative except as noted above in the HPI.      PHYSICAL EXAMINATION:   VITAL SIGNS:  Blood pressure 135/86, pulse is 70, respiratory rate is 20, SpO2 is 96% on room air.   GENERAL APPEARANCE:  Well-developed, well-nourished, in no distress.   EYES:  PERRL.  No conjunctival injection.   HENT:  Nasal mucosa is within normal limits.   MOUTH:  Oral mucosa is moist.  No posterior oropharyngeal erythema or exudate.    NECK:  Supple with no masses.   LYMPHATICS:  No cervical or supraclavicular lymphadenopathy.   RESPIRATORY:  Good air movement bilaterally.  No wheezes, rales or rhonchi.   CARDIOVASCULAR:  Regular rate and rhythm, normal S1, S2, no murmurs, rubs or gallops.  JVP not appreciated with patient sitting upright at 90 degrees.  No lower extremity edema is noted.   MUSCULOSKELETAL:  No clubbing or cyanosis.   SKIN:  No rash on limited exam.   NEUROLOGIC:  Mentation appears intact and speech is fluent.   PSYCHIATRIC:  Affect appears appropriate.      RESULTS:  Pulmonary function testing from today reviewed by me.  Prebronchodilator spirometry shows mild obstruction.  There is a significant bronchodilator response with an increase in FEV1 by 39%.  Lung volumes are within normal limits.  DLCO is normal.      Previous chest imaging reviewed.  Most recent chest x-ray from 12/2016 shows no acute cardiopulmonary disease.      LABORATORY:  No recent pertinent labs available.      ASSESSMENT AND PLAN:  The patient is a 56-year-old woman here for evaluation of asthma.     1.  Asthma.  The patient has asthma diagnosed by a significant bronchodilator response and full reversibility on PFTs.  We spent all of our time talking about asthma and asthma control.  She does have a mild obstruction prebronchodilator, but does not appear to be terribly symptomatic despite pulmonary function testing abnormalities.  We spent a lot of her time talking about the utility of treating asthma.  Given full reversibility by pulmonary function testing, I think that a baseline therapy for her asthma with fluticasone would be very reasonable and would likely prevent progression of her asthma to fixed airflow obstruction.  We talked about the safety profile of inhaled corticosteroids as well as the benefits of inhaled steroid use including a reduction in chronic inflammation and reduction in progression to irreversible airflow obstruction.  I think it  would be very reasonable for her to use fluticasone on a more regular basis in order to continue to maintain her pulmonary function testing.  I suspect that part of the reason that she had significant difficulty in improving her symptoms during her lower respiratory tract infection this last spring was related to chronic uncontrolled airway inflammation.  We spent a little bit of time talking about allergies as potential contributors to her asthma.  She has done desensitization shots in the past, but is not interested in a new evaluation in an allergy clinic.   I think that she minimizes her symptoms, and I would recommend that she follow peak flows over the course of the next couple of months.  I think this might help give her a better sense of the changes in her breathing over the course of time.  She is planning on going [facility].  I provided her with an as needed prescription for prednisone to take with her as well as refills of Albuterol to bring along as well.  We talked a little bit about inhaler technique.  She does have a spacer although I do note that it is quite aged.  I wrote her for new prescription for an OptiChamber today.  I will have her come back and see me in approximately November.  At that time she will have been following her peak flows for several months, then we can get a good sense of how consistent use of fluticasone is changing her breathing.  We did talk about adjunct therapies for asthma, but she is not interested in any of the biologics at this time, and thus I did not order any additional laboratory testing today.  We will continue to keep this option open in the future.         MARIANO HAGEN MD             D: 2017 14:09   T: 2017 15:03   MT: [place]      Name:     ANGEL NOBLES   MRN:      [id number]        Account:      [id number]   :      1960           Service Date: 2017      Document: [id number]

## 2017-08-14 NOTE — LETTER
"8/14/2017      RE: Ofelia Valenzuela  [address] [facility] [address]       CHIEF COMPLAINT:  Asthma.      REFERRING PROVIDER:  ZEB Hahn.       HISTORY OF PRESENT ILLNESS:  The patient is a 56-year-old woman here for evaluation of asthma.  She states that she has a longstanding history of asthma, initially diagnosed in the mid-80s.  At that time she had extreme difficulty breathing with exposure to cold air.  She went to urgent care and was diagnosed with asthma.  She says that since then, she has had issues with her breathing, mostly in the spring and fall.  She was referred to an allergist and was diagnosed with sensitivity to dust, dogs and cats.  She underwent desensitization shots and was prescribed fluticasone inhaler which she took primarily in the spring and fall.  She says that she normally does not have much in the way of respiratory symptoms, but does occasionally wheeze when she is doing what she describes as heavy activity.  She says that in the last couple of years she has been quite lax with her asthma treatment.      She says that this spring she developed a respiratory infection that settled into her chest.  She describes it as \"crud.\"  She says that at the time she had symptoms lingered for much longer than she expected.  She was initially given a course of prednisone for 5 days with incomplete resolution of symptoms and was subsequently given a longer course of steroids which eventually improved her breathing.  She describes the \"crud\" as a cough productive of sputum typically white to yellow.  At that time she was also waking up coughing.  She says that typically when she gets an upper respiratory infection, it does settle in her chest and cause difficulty with her breathing.  At the time of the symptoms last April she was prescribed montelukast, but did not think it changed her symptoms.  She says that she occasionally uses albuterol which does help her breathing, but it " takes a little bit longer than she expects for it to help.  She has a spacer that she was initially prescribed about 10 years ago.  She says that oftentimes when she gets an upper respiratory infection.  She also uses a Neti pot for saline irrigation.      When asked about why she does not use her fluticasone consistently, she says that mostly she just would prefer not to take medications.  She is concerned about the systemic side effects of steroids.  She says that she does not recall having any significant adverse effects from using a fluticasone in the past.  At her best she does not have limitations with her breathing, at her worst she needs to take albuterol more consistently.  She has a peak flow meter but has not used for many years.  She describes her worst time of the year is spring and fall.  She does occasionally cough when going out into the cold, but most of the time she uses a mask or scarf over her face to help her breathing.      PAST MEDICAL HISTORY:   1.  Asthma.   2.  Multinodular goiter      FAMILY HISTORY:  She has a sister with asthma.  Father had coronary artery disease.  Mother  at age 66 after a seizure.  She had been having seizures after a traumatic brain injury.      SOCIAL HISTORY:  Patient is a lifetime nonsmoker.  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  She does not have any pets.  They have hardwood floors in [facility] home.  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      REVIEW OF SYSTEMS:  A full 14-point review of systems was done and is negative except as noted above in the HPI.      PHYSICAL EXAMINATION:   VITAL SIGNS:  Blood pressure 135/86, pulse is 70, respiratory rate is 20, SpO2 is 96% on room air.   GENERAL APPEARANCE:  Well-developed, well-nourished, in no distress.   EYES:  PERRL.  No conjunctival injection.   HENT:  Nasal mucosa is within  normal limits.   MOUTH:  Oral mucosa is moist.  No posterior oropharyngeal erythema or exudate.   NECK:  Supple with no masses.   LYMPHATICS:  No cervical or supraclavicular lymphadenopathy.   RESPIRATORY:  Good air movement bilaterally.  No wheezes, rales or rhonchi.   CARDIOVASCULAR:  Regular rate and rhythm, normal S1, S2, no murmurs, rubs or gallops.  JVP not appreciated with patient sitting upright at 90 degrees.  No lower extremity edema is noted.   MUSCULOSKELETAL:  No clubbing or cyanosis.   SKIN:  No rash on limited exam.   NEUROLOGIC:  Mentation appears intact and speech is fluent.   PSYCHIATRIC:  Affect appears appropriate.      RESULTS:  Pulmonary function testing from today reviewed by me.  Prebronchodilator spirometry shows mild obstruction.  There is a significant bronchodilator response with an increase in FEV1 by 39%.  Lung volumes are within normal limits.  DLCO is normal.      Previous chest imaging reviewed.  Most recent chest x-ray from 12/2016 shows no acute cardiopulmonary disease.      LABORATORY:  No recent pertinent labs available.      ASSESSMENT AND PLAN:  The patient is a 56-year-old woman here for evaluation of asthma.     1.  Asthma.  The patient has asthma diagnosed by a significant bronchodilator response and full reversibility on PFTs.  We spent all of our time talking about asthma and asthma control.  She does have a mild obstruction prebronchodilator, but does not appear to be terribly symptomatic despite pulmonary function testing abnormalities.  We spent a lot of her time talking about the utility of treating asthma.  Given full reversibility by pulmonary function testing, I think that a baseline therapy for her asthma with fluticasone would be very reasonable and would likely prevent progression of her asthma to fixed airflow obstruction.  We talked about the safety profile of inhaled corticosteroids as well as the benefits of inhaled steroid use including a reduction in  chronic inflammation and reduction in progression to irreversible airflow obstruction.  I think it would be very reasonable for her to use fluticasone on a more regular basis in order to continue to maintain her pulmonary function testing.  I suspect that part of the reason that she had significant difficulty in improving her symptoms during her lower respiratory tract infection this last spring was related to chronic uncontrolled airway inflammation.  We spent a little bit of time talking about allergies as potential contributors to her asthma.  She has done desensitization shots in the past, but is not interested in a new evaluation in an allergy clinic.   I think that she minimizes her symptoms, and I would recommend that she follow peak flows over the course of the next couple of months.  I think this might help give her a better sense of the changes in her breathing over the course of time.  She is planning on going to the [facility] in September.  I provided her with an as needed prescription for prednisone to take with her as well as refills of Albuterol to bring along as well.  We talked a little bit about inhaler technique.  She does have a spacer although I do note that it is quite aged.  I wrote her for new prescription for an OptiChamber today.  I will have her come back and see me in approximately November.  At that time she will have been following her peak flows for several months, then we can get a good sense of how consistent use of fluticasone is changing her breathing.  We did talk about adjunct therapies for asthma, but she is not interested in any of the biologics at this time, and thus I did not order any additional laboratory testing today.  We will continue to keep this option open in the future.         MARIANO HAGEN MD             D: 2017 14:09   T: 2017 15:03   MT: [place]      Name:     ANGEL NOBLES   MRN:      [id number]        Account:      [id number]   :       1960           Service Date: 08/14/2017      Document: [id number]

## 2017-08-14 NOTE — PATIENT INSTRUCTIONS
It was nice to meet you today.    We talked about asthma and asthma control- and specifically, that asthma is a disease of chronic inflammation. Chronic uncontrolled inflammation can lead to COPD- which we don't want.    Try flovent twice a day throughout the winter. It may be a good idea to chart your peak flows (the same time of day each day, 3 recordings) to get a sense of what your asthma is doing in the coming months.    I wrote for flovent, a new albuterol prescription, and prednisone (to be used as needed). If you do use the prednisone, please let me know.    Jenae May

## 2017-08-14 NOTE — PROGRESS NOTES
Answers for HPI/ROS submitted by the patient on 8/13/2017   General Symptoms: No  Skin Symptoms: No  HENT Symptoms: No  EYE SYMPTOMS: No  HEART SYMPTOMS: No  LUNG SYMPTOMS: Yes  INTESTINAL SYMPTOMS: No  URINARY SYMPTOMS: No  GYNECOLOGIC SYMPTOMS: No  BREAST SYMPTOMS: No  SKELETAL SYMPTOMS: No  BLOOD SYMPTOMS: No  NERVOUS SYSTEM SYMPTOMS: No  MENTAL HEALTH SYMPTOMS: No  Cough: No  Sputum or phlegm: No  Coughing up blood: No  Difficulty breating or shortness of breath: No  Snoring: No  Wheezing: No  Difficulty breathing on exertion: Yes  Respiratory pain: No  Nighttime Cough: No  Difficulty breathing when lying flat: No

## 2017-08-14 NOTE — MR AVS SNAPSHOT
After Visit Summary   8/14/2017    Ofelia Valenzuela    MRN: [id number]           Patient Information     Date Of Birth          1960        Visit Information        Provider Department      8/14/2017 8:00 AM Jenae May MD [facility]        Today's Diagnoses     Mild persistent asthma with acute exacerbation        Cough        Mild persistent asthma without complication          Care Instructions    It was nice to meet you today.    We talked about asthma and asthma control- and specifically, that asthma is a disease of chronic inflammation. Chronic uncontrolled inflammation can lead to COPD- which we don't want.    Try flovent twice a day throughout the winter. It may be a good idea to chart your peak flows (the same time of day each day, 3 recordings) to get a sense of what your asthma is doing in the coming months.    I wrote for flovent, a new albuterol prescription, and prednisone (to be used as needed). If you do use the prednisone, please let me know.    Jenae May           Follow-ups after your visit        Follow-up notes from your care team     Return in about 3 months (around 11/14/2017).      Your next 10 appointments already scheduled     Dec 11, 2017  8:00 AM CST   (Arrive by 7:45 AM)   Return Visit with Jenae May MD   [facility] ([facility])    [address] [place] [address] [phone]              Who to contact     If you have questions or need follow up information about today's clinic visit or your schedule please contact [facility] directly at [phone].  Normal or non-critical lab and imaging results will be communicated to you by MyChart, letter or phone within 4 business days after the clinic has received the results. If you do not hear from us within 7 days,  please contact the clinic through [employer] or phone. If you have a critical or abnormal lab result, we will notify you by phone as soon as possible.  Submit refill requests through [employer] or call your pharmacy and they will forward the refill request to us. Please allow 3 business days for your refill to be completed.          Additional Information About Your Visit        [employer] Information     [employer] gives you secure access to your electronic health record. If you see a primary care provider, you can also send messages to your care team and make appointments. If you have questions, please call your primary care clinic.  If you do not have a primary care provider, please call [phone] and they will assist you.        Care EveryWhere ID     This is your Care EveryWhere ID. This could be used by other organizations to access your [place] medical records  [id number]        Your Vitals Were     Pulse Respirations Last Period Pulse Oximetry Breastfeeding?       70 20 12/07/2006 96% No        Blood Pressure from Last 3 Encounters:   08/14/17 135/86   07/13/17 108/64   04/27/17 122/74    Weight from Last 3 Encounters:   07/13/17 93 kg (205 lb)   04/27/17 98 kg (216 lb)   04/06/17 94.8 kg (209 lb)              We Performed the Following     OPTICHAMBER          Today's Medication Changes          These changes are accurate as of: 8/14/17  9:06 AM.  If you have any questions, ask your nurse or doctor.               Start taking these medicines.        Dose/Directions    fluticasone 110 MCG/ACT Inhaler   Commonly known as:  FLOVENT HFA   Used for:  Mild persistent asthma with acute exacerbation, Cough   Started by:  Jenae May MD        Dose:  1 puff   Inhale 1 puff into the lungs 2 times daily   Quantity:  1 Inhaler   Refills:  11         These medicines have changed or have updated prescriptions.        Dose/Directions    predniSONE 20 MG tablet   Commonly known as:  DELTASONE   This may have  changed:    - how much to take  - when to take this  - Another medication with the same name was removed. Continue taking this medication, and follow the directions you see here.   Used for:  Mild persistent asthma with acute exacerbation   Changed by:  Jenae May MD        Dose:  40 mg   Take 2 tablets (40 mg) by mouth daily   Quantity:  10 tablet   Refills:  1         Stop taking these medicines if you haven't already. Please contact your care team if you have questions.     fluticasone 100 MCG/BLIST Aepb   Commonly known as:  FLOVENT DISKUS   Stopped by:  Jenae May MD           fluticasone-salmeterol 250-50 MCG/DOSE diskus inhaler   Commonly known as:  ADVAIR   Stopped by:  Jenae May MD           glucosamine complex Tabs   Stopped by:  Jenae May MD           mometasone-formoterol 200-5 MCG/ACT oral inhaler   Commonly known as:  DULERA   Stopped by:  Jenae May MD           montelukast 10 MG tablet   Commonly known as:  SINGULAIR   Stopped by:  Jenae May MD           valACYclovir 1000 mg tablet   Commonly known as:  VALTREX   Stopped by:  Jenae May MD                Where to get your medicines      These medications were sent to [facility] [address] [place] [address] AT [address] [address]     Phone:  [phone]     albuterol 108 (90 BASE) MCG/ACT Inhaler    fluticasone 110 MCG/ACT Inhaler    predniSONE 20 MG tablet                Primary Care Provider Office Phone # Fax #    Kassie ZEB Hollins [facility] [phone]       [address]  [facility] [address]        Equal Access to Services     JAMES ADAMS : Julio Beckford, wayvanda lupetey, qaybta kaalmada aldo, naima rasheed. So [facility] [phone].    ATENCIÓN: Si habla español, tiene a loera disposición  servicios gratuitos de asistencia lingüística. Sadie nazario [phone].    We comply with applicable federal civil rights laws and [place] laws. We do not discriminate on the basis of race, color, national origin, age, disability sex, sexual orientation or gender identity.            Thank you!     Thank you for choosing [facility]  for your care. Our goal is always to provide you with excellent care. Hearing back from our patients is one way we can continue to improve our services. Please take a few minutes to complete the written survey that you may receive in the mail after your visit with us. Thank you!             Your Updated Medication List - Protect others around you: Learn how to safely use, store and throw away your medicines at www.disposemymeds.org.          This list is accurate as of: 8/14/17  9:06 AM.  Always use your most recent med list.                   Brand Name Dispense Instructions for use Diagnosis    albuterol 108 (90 BASE) MCG/ACT Inhaler    albuterol    1 Inhaler    Inhale 2 puffs into the lungs every 4 hours as needed for shortness of breath / dyspnea or wheezing    Mild persistent asthma without complication       Calcium 4112-3948 MG-UNIT Chew      Take 2 chew tab by mouth daily Reported on 4/27/2017    Multinodular goiter       DAILY MULTIVITAMIN PO      Take 1 tablet by mouth daily Reported on 4/27/2017    Multinodular goiter       fluticasone 110 MCG/ACT Inhaler    FLOVENT HFA    1 Inhaler    Inhale 1 puff into the lungs 2 times daily    Mild persistent asthma with acute exacerbation, Cough       predniSONE 20 MG tablet    DELTASONE    10 tablet    Take 2 tablets (40 mg) by mouth daily    Mild persistent asthma with acute exacerbation       vitamin D 1000 UNITS capsule      Take 1 capsule by mouth daily Reported on 4/27/2017    Multinodular goiter

## 2017-08-22 LAB
DLCOUNC-%PRED-PRE: 119 %
DLCOUNC-PRE: 26.04 ML/MIN/MMHG
DLCOUNC-PRED: 21.85 ML/MIN/MMHG
ERV-%PRED-PRE: 89 %
ERV-PRE: 0.39 L
ERV-PRED: 0.44 L
EXPTIME-PRE: 8.88 SEC
FEF2575-%PRED-POST: 88 %
FEF2575-%PRED-PRE: 35 %
FEF2575-POST: 2.09 L/SEC
FEF2575-PRE: 0.83 L/SEC
FEF2575-PRED: 2.36 L/SEC
FEFMAX-%PRED-PRE: 79 %
FEFMAX-PRE: 5.06 L/SEC
FEFMAX-PRED: 6.4 L/SEC
FEV1-%PRED-PRE: 66 %
FEV1-PRE: 1.69 L
FEV1FEV6-PRE: 59 %
FEV1FEV6-PRED: 81 %
FEV1FVC-PRE: 59 %
FEV1FVC-PRED: 80 %
FEV1SVC-PRE: 51 %
FEV1SVC-PRED: 77 %
FIFMAX-PRE: 5.82 L/SEC
FRCPLETH-%PRED-PRE: 97 %
FRCPLETH-PRE: 2.64 L
FRCPLETH-PRED: 2.7 L
FVC-%PRED-PRE: 88 %
FVC-PRE: 2.86 L
FVC-PRED: 3.23 L
IC-%PRED-PRE: 101 %
IC-PRE: 2.92 L
IC-PRED: 2.87 L
RVPLETH-%PRED-PRE: 120 %
RVPLETH-PRE: 2.24 L
RVPLETH-PRED: 1.87 L
TLCPLETH-%PRED-PRE: 112 %
TLCPLETH-PRE: 5.56 L
TLCPLETH-PRED: 4.94 L
VA-%PRED-PRE: 95 %
VA-PRE: 4.82 L
VC-%PRED-PRE: 100 %
VC-PRE: 3.31 L
VC-PRED: 3.31 L

## 2017-12-11 ENCOUNTER — OFFICE VISIT (OUTPATIENT)
Dept: PULMONOLOGY | Facility: CLINIC | Age: 57
End: 2017-12-11
Attending: INTERNAL MEDICINE
Payer: COMMERCIAL

## 2017-12-11 VITALS
HEART RATE: 80 BPM | RESPIRATION RATE: 16 BRPM | SYSTOLIC BLOOD PRESSURE: 140 MMHG | DIASTOLIC BLOOD PRESSURE: 89 MMHG | OXYGEN SATURATION: 97 %

## 2017-12-11 DIAGNOSIS — J45.20 MILD INTERMITTENT ASTHMA WITHOUT COMPLICATION: Primary | ICD-10-CM

## 2017-12-11 PROCEDURE — 99212 OFFICE O/P EST SF 10 MIN: CPT | Mod: ZF

## 2017-12-11 PROCEDURE — 40000809 ZZH STATISTIC NO DOCUMENTATION TO SUPPORT CHARGE

## 2017-12-11 ASSESSMENT — PAIN SCALES - GENERAL: PAINLEVEL: MILD PAIN (3)

## 2017-12-11 NOTE — LETTER
"2017       RE: Ofelia Valenzuela  [address]  [facility] [address]     Dear Colleague,    Thank you for referring your patient, Ofelia Valenzuela, to the [facility]. Please see a copy of my visit note below.    CHIEF COMPLAINT:  Asthma.      HISTORY OF PRESENT ILLNESS:  The patient is a 57-year-old woman here for followup of asthma.  She says that since her last visit she has been more consistent about using the fluticasone.  She says that it has helped her breathing.  She says that she has not needed her rescue inhaler since her last visit.  She takes a daily walk and has not been as wheezy.  She also thinks that she is waking up more refreshed in the morning.  She says that she uses fluticasone 1 puff consistently in the morning but forgets about half the time at night.  She says that her allergies have been under reasonably good control.  She mostly avoids cats and dogs and tries to stay out of rj environments.  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  She says that she worked on that all weekend and did not have problems.  She does have sick contacts at work but says that for the most part she has not gotten any upper respiratory infections since her last visit.       PAST MEDICAL HISTORY:   1.  Asthma.   2.  Multinodular goiter.       FAMILY HISTORY:  She has a sister with asthma.  Father had coronary artery disease.  Mother  at 66 after a seizure.  She had been having seizures after a traumatic brain injury.      SOCIAL HISTORY:  She is a lifetime nonsmoker.  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  She worked for the [place] [employer].  She does not have any pets.  She has hardwood floors in her home.       REVIEW OF SYSTEMS:  A 10-point review of systems was done and was negative except as noted above " and in the HPI.       PHYSICAL EXAMINATION:   VITAL SIGNS:  Blood pressure 140/89, pulse is 80, respiratory rate is 16, SpO2 is 97% on room air.   GENERAL APPEARANCE:  Well-developed, well-nourished, in no distress.   EYES:  PERRL.  No conjunctival injection.   HENT:  Nasal mucosa is within normal limits.   MOUTH:  Oral mucosa is moist.  No posterior oropharyngeal erythema or exudate.   RESPIRATORY:  Good air movement bilaterally.  No wheezes, rales or rhonchi.   CARDIOVASCULAR:  Regular rate and rhythm.  Normal S1, S2.  No murmurs, rubs or gallops.  JVP not appreciated with patient sitting upright at 90 degrees.  No lower extremity edema noted.      RESULTS:  Pulmonary function testing from 08/2017 was reviewed directly by me.  This shows mild obstruction with full reversibility, normal lung volumes and normal DLCO.      Chest x-ray from 12/2016 shows no acute cardiopulmonary disease.      We have no recent labs.      ASSESSMENT AND PLAN:  The patient is a 57-year-old woman with a history of asthma, here for ongoing followup.      Asthma.  The patient has had significant improvement in symptoms after becoming more consistent about using the fluticasone.  She is now using it every morning and most evenings before going to bed.  She says that it has improved her breathing and she actually feels like she is sleeping much better than she was before as well.  She is very encouraged by this.  She is a little worried that she is going to get an upper respiratory infection that is going to worsen her breathing in the winter.  I encouraged her to continue with good hand washing as the best strategy for preventing upper respiratory infections.  She does have a prescription for prednisone on hand.  If she does need to use it, she will just contact me and I can refill the prescription so that she can have it on-hand.  I did tell her that when she has an exacerbation needing prednisone, she should be much more consistent using  "the fluticasone for approximately 1 month afterwards to make sure that she fully recovers from her exacerbation.  We did talk a little bit about following along with her asthma symptoms.  I gave her a peak flow meter the last time she was in clinic.  Although she used it a couple of times, she did not find it very \"motivating\" and she says that for the most part she is simply encouraged that her breathing has been so much better since our last visit.  I provided her with refills of medications.  I will have her come back and see me in approximately 1 year or sooner as needed.         MARIANO HAGEN MD             D: 2017 09:27   T: 2017 09:55   MT: LEONARDO      Name:     ANGEL NOBLES   MRN:      [id number]        Account:      [id number]   :      1960           Service Date: 2017      Document: [id number]            "

## 2017-12-11 NOTE — NURSING NOTE
Chief Complaint   Patient presents with     Breathing Problem     Patient is being seen for follow up of breathing issues      Ramona Martin CMA at 8:06 AM on 12/11/2017

## 2017-12-11 NOTE — MR AVS SNAPSHOT
After Visit Summary   12/11/2017    Ofelia Valenzuela    MRN: [id number]           Patient Information     Date Of Birth          1960        Visit Information        Provider Department      12/11/2017 8:00 AM Jenae May MD [facility]        Today's Diagnoses     Mild intermittent asthma without complication    -  1      Care Instructions    It was nice to see you again today.    I'm glad you're doing well.      If you get sick this winter, I recommend prednisone and being very consistent about your flovent for at least a month, then go back to your current practice.    Jenae May           Follow-ups after your visit        Follow-up notes from your care team     Return in about 1 year (around 12/11/2018).      Who to contact     If you have questions or need follow up information about today's clinic visit or your schedule please contact [facility] directly at [phone].  Normal or non-critical lab and imaging results will be communicated to you by Existence Before Essencehart, letter or phone within 4 business days after the clinic has received the results. If you do not hear from us within 7 days, please contact the clinic through Friendsurancet or phone. If you have a critical or abnormal lab result, we will notify you by phone as soon as possible.  Submit refill requests through Classiqs or call your pharmacy and they will forward the refill request to us. Please allow 3 business days for your refill to be completed.          Additional Information About Your Visit        Existence Before Essencehart Information     Classiqs gives you secure access to your electronic health record. If you see a primary care provider, you can also send messages to your care team and make appointments. If you have questions, please call your primary care clinic.  If you do not have a primary care provider, please call [phone] and they will assist  you.        Care EveryWhere ID     This is your Care EveryWhere ID. This could be used by other organizations to access your [place] medical records  [id number]        Your Vitals Were     Pulse Respirations Last Period Pulse Oximetry          80 16 12/07/2006 97%         Blood Pressure from Last 3 Encounters:   12/11/17 140/89   08/14/17 135/86   07/13/17 108/64    Weight from Last 3 Encounters:   07/13/17 93 kg (205 lb)   04/27/17 98 kg (216 lb)   04/06/17 94.8 kg (209 lb)              Today, you had the following     No orders found for display       Primary Care Provider Office Phone # Fax #    Kassie Suárez ZEB Benavidez [facility] [phone]       [address]  [facility] [address]        Equal Access to Services     JAMES [employer] GroupISMA : Hadii aad ku hadasho Soomaali, waaxda luqadaha, qaybta kaalmada adeegyakeisha, naima mendoza . So [facility] [phone].    ATENCIÓN: Si habla español, tiene a loera disposición servicios gratuitos de asistencia lingüística. [facility] [phone].    We comply with applicable federal civil rights laws and [place] laws. We do not discriminate on the basis of race, color, national origin, age, disability, sex, sexual orientation, or gender identity.            Thank you!     Thank you for choosing [facility]  for your care. Our goal is always to provide you with excellent care. Hearing back from our patients is one way we can continue to improve our services. Please take a few minutes to complete the written survey that you may receive in the mail after your visit with us. Thank you!             Your Updated Medication List - Protect others around you: Learn how to safely use, store and throw away your medicines at www.disposemymeds.org.          This list is accurate as of: 12/11/17  3:46 PM.  Always use your most recent med list.                   Brand Name Dispense Instructions for use Diagnosis    albuterol 108 (90  BASE) MCG/ACT Inhaler    PROAIR HFA    1 Inhaler    Inhale 2 puffs into the lungs every 4 hours as needed for shortness of breath / dyspnea or wheezing    Mild persistent asthma without complication       Calcium 3580-4420 MG-UNIT Chew      Take 2 chew tab by mouth daily Reported on 4/27/2017    Multinodular goiter       DAILY MULTIVITAMIN PO      Take 1 tablet by mouth daily Reported on 4/27/2017    Multinodular goiter       fluticasone 110 MCG/ACT Inhaler    FLOVENT HFA    1 Inhaler    Inhale 1 puff into the lungs 2 times daily    Mild persistent asthma with acute exacerbation, Cough       vitamin D 1000 UNITS capsule      Take 1 capsule by mouth daily Reported on 4/27/2017    Multinodular goiter

## 2017-12-11 NOTE — PROGRESS NOTES
"CHIEF COMPLAINT:  Asthma.      HISTORY OF PRESENT ILLNESS:  The patient is a 57-year-old woman here for followup of asthma.  She says that since her last visit she has been more consistent about using the fluticasone.  She says that it has helped her breathing.  She says that she has not needed her rescue inhaler since her last visit.  She takes a daily walk and has not been as wheezy.  She also thinks that she is waking up more refreshed in the morning.  She says that she uses fluticasone 1 puff consistently in the morning but forgets about half the time at night.  She says that her allergies have been under reasonably good control.  She mostly avoids cats and dogs and tries to stay out of rj environments.  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  She says that she worked on that all weekend and did not have problems.  She does have sick contacts at work but says that for the most part she has not gotten any upper respiratory infections since her last visit.       PAST MEDICAL HISTORY:   1.  Asthma.   2.  Multinodular goiter.       FAMILY HISTORY:  She has a sister with asthma.  Father had coronary artery disease.  Mother  at 66 after a seizure.  She had been having seizures after a traumatic brain injury.      SOCIAL HISTORY:  She is a lifetime nonsmoker.  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  She worked for the [place] [employer].  She does not have any pets.  She has hardwood floors in her home.       REVIEW OF SYSTEMS:  A 10-point review of systems was done and was negative except as noted above and in the HPI.       PHYSICAL EXAMINATION:   VITAL SIGNS:  Blood pressure 140/89, pulse is 80, respiratory rate is 16, SpO2 is 97% on room air.   GENERAL APPEARANCE:  Well-developed, well-nourished, in no distress.   EYES:  PERRL.  No conjunctival injection.   HENT:  Nasal mucosa is within normal limits.   MOUTH:  " Oral mucosa is moist.  No posterior oropharyngeal erythema or exudate.   RESPIRATORY:  Good air movement bilaterally.  No wheezes, rales or rhonchi.   CARDIOVASCULAR:  Regular rate and rhythm.  Normal S1, S2.  No murmurs, rubs or gallops.  JVP not appreciated with patient sitting upright at 90 degrees.  No lower extremity edema noted.      RESULTS:  Pulmonary function testing from 08/2017 was reviewed directly by me.  This shows mild obstruction with full reversibility, normal lung volumes and normal DLCO.      Chest x-ray from 12/2016 shows no acute cardiopulmonary disease.      We have no recent labs.      ASSESSMENT AND PLAN:  The patient is a 57-year-old woman with a history of asthma, here for ongoing followup.      Asthma.  The patient has had significant improvement in symptoms after becoming more consistent about using the fluticasone.  She is now using it every morning and most evenings before going to bed.  She says that it has improved her breathing and she actually feels like she is sleeping much better than she was before as well.  She is very encouraged by this.  She is a little worried that she is going to get an upper respiratory infection that is going to worsen her breathing in the winter.  I encouraged her to continue with good hand washing as the best strategy for preventing upper respiratory infections.  She does have a prescription for prednisone on hand.  If she does need to use it, she will just contact me and I can refill the prescription so that she can have it on-hand.  I did tell her that when she has an exacerbation needing prednisone, she should be much more consistent using the fluticasone for approximately 1 month afterwards to make sure that she fully recovers from her exacerbation.  We did talk a little bit about following along with her asthma symptoms.  I gave her a peak flow meter the last time she was in clinic.  Although she used it a couple of times, she did not find it very  "\"motivating\" and she says that for the most part she is simply encouraged that her breathing has been so much better since our last visit.  I provided her with refills of medications.  I will have her come back and see me in approximately 1 year or sooner as needed.         MARIANO HAGEN MD             D: 2017 09:27   T: 2017 09:55   MT: DP      Name:     ANGEL NOBLES   MRN:      [id number]        Account:      [id number]   :      1960           Service Date: 2017      Document: [id number]      "

## 2017-12-11 NOTE — PATIENT INSTRUCTIONS
It was nice to see you again today.    I'm glad you're doing well.      If you get sick this winter, I recommend prednisone and being very consistent about your flovent for at least a month, then go back to your current practice.    Jenae May

## 2018-01-02 ENCOUNTER — TELEPHONE (OUTPATIENT)
Dept: FAMILY MEDICINE | Facility: CLINIC | Age: 58
End: 2018-01-02

## 2018-01-02 NOTE — TELEPHONE ENCOUNTER
"01/02/2018      Patient Communication Preferences indicate  Do not contact  and/or communication by \"Phone\" is not preferred. Call not required per Outreach team.        Outreach ,  Ashlyn Basilio     "

## 2018-01-04 ENCOUNTER — MYC MEDICAL ADVICE (OUTPATIENT)
Dept: FAMILY MEDICINE | Facility: CLINIC | Age: 58
End: 2018-01-04

## 2018-02-15 ENCOUNTER — MYC MEDICAL ADVICE (OUTPATIENT)
Dept: FAMILY MEDICINE | Facility: CLINIC | Age: 58
End: 2018-02-15

## 2018-02-15 ENCOUNTER — OFFICE VISIT (OUTPATIENT)
Dept: FAMILY MEDICINE | Facility: CLINIC | Age: 58
End: 2018-02-15
Payer: COMMERCIAL

## 2018-02-15 VITALS
RESPIRATION RATE: 12 BRPM | HEART RATE: 67 BPM | WEIGHT: 213 LBS | TEMPERATURE: 97.6 F | BODY MASS INDEX: 36 KG/M2 | SYSTOLIC BLOOD PRESSURE: 126 MMHG | OXYGEN SATURATION: 100 % | DIASTOLIC BLOOD PRESSURE: 76 MMHG

## 2018-02-15 DIAGNOSIS — N64.4 BREAST PAIN, LEFT: ICD-10-CM

## 2018-02-15 DIAGNOSIS — M79.672 BILATERAL FOOT PAIN: ICD-10-CM

## 2018-02-15 DIAGNOSIS — M79.671 BILATERAL FOOT PAIN: ICD-10-CM

## 2018-02-15 DIAGNOSIS — Z13.6 CARDIOVASCULAR SCREENING; LDL GOAL LESS THAN 160: ICD-10-CM

## 2018-02-15 DIAGNOSIS — Z85.828 HISTORY OF BASAL CELL CARCINOMA: ICD-10-CM

## 2018-02-15 DIAGNOSIS — Z13.1 SCREENING FOR DIABETES MELLITUS: ICD-10-CM

## 2018-02-15 DIAGNOSIS — J45.31 MILD PERSISTENT ASTHMA WITH ACUTE EXACERBATION: ICD-10-CM

## 2018-02-15 DIAGNOSIS — E04.2 MULTINODULAR GOITER: ICD-10-CM

## 2018-02-15 DIAGNOSIS — Z01.419 ENCOUNTER FOR GYNECOLOGICAL EXAMINATION (GENERAL) (ROUTINE) WITHOUT ABNORMAL FINDINGS: Primary | ICD-10-CM

## 2018-02-15 LAB
CHOLEST SERPL-MCNC: 201 MG/DL
GLUCOSE SERPL-MCNC: 83 MG/DL (ref 70–99)
HDLC SERPL-MCNC: 70 MG/DL
LDLC SERPL CALC-MCNC: 112 MG/DL
NONHDLC SERPL-MCNC: 131 MG/DL
TRIGL SERPL-MCNC: 95 MG/DL
TSH SERPL DL<=0.005 MIU/L-ACNC: 1.35 MU/L (ref 0.4–4)

## 2018-02-15 PROCEDURE — 82947 ASSAY GLUCOSE BLOOD QUANT: CPT | Performed by: NURSE PRACTITIONER

## 2018-02-15 PROCEDURE — 99396 PREV VISIT EST AGE 40-64: CPT | Performed by: NURSE PRACTITIONER

## 2018-02-15 PROCEDURE — 36415 COLL VENOUS BLD VENIPUNCTURE: CPT | Performed by: NURSE PRACTITIONER

## 2018-02-15 PROCEDURE — 84443 ASSAY THYROID STIM HORMONE: CPT | Performed by: NURSE PRACTITIONER

## 2018-02-15 PROCEDURE — G0145 SCR C/V CYTO,THINLAYER,RESCR: HCPCS | Performed by: NURSE PRACTITIONER

## 2018-02-15 PROCEDURE — 80061 LIPID PANEL: CPT | Performed by: NURSE PRACTITIONER

## 2018-02-15 PROCEDURE — 87624 HPV HI-RISK TYP POOLED RSLT: CPT | Performed by: NURSE PRACTITIONER

## 2018-02-15 NOTE — MR AVS SNAPSHOT
After Visit Summary   2/15/2018    Ofelia Valenzuela    MRN: [id number]           Patient Information     Date Of Birth          1960        Visit Information        Provider Department      2/15/2018 9:40 AM Kassie Benavidez APRN [facility]        Today's Diagnoses     Encounter for routine adult health examination without abnormal findings    -  1    Mild persistent asthma with acute exacerbation        Multinodular goiter        CARDIOVASCULAR SCREENING; LDL GOAL LESS THAN 160        Screening for diabetes mellitus        Bilateral foot pain        Breast pain, left          Care Instructions    1.  Updated pap  2.  Labs today  3.  Schedule diagnostic mammogram  4.  Orthotics referral    Preventive Health Recommendations  Female Ages 50 - 64    Yearly exam: See your health care provider every year in order to  o Review health changes.   o Discuss preventive care.    o Review your medicines if your doctor has prescribed any.      Get a Pap test every three years (unless you have an abnormal result and your provider advises testing more often).    If you get Pap tests with HPV test, you only need to test every 5 years, unless you have an abnormal result.     You do not need a Pap test if your uterus was removed (hysterectomy) and you have not had cancer.    You should be tested each year for STDs (sexually transmitted diseases) if you're at risk.     Have a mammogram every 1 to 2 years.    Have a colonoscopy at age 50, or have a yearly FIT test (stool test). These exams screen for colon cancer.      Have a cholesterol test every 5 years, or more often if advised.    Have a diabetes test (fasting glucose) every three years. If you are at risk for diabetes, you should have this test more often.     If you are at risk for osteoporosis (brittle bone disease), think about having a bone density scan (DEXA).    Shots: Get a flu shot each year. Get a tetanus shot every 10  years.    Nutrition:     Eat at least 5 servings of fruits and vegetables each day.    Eat whole-grain bread, whole-wheat pasta and brown rice instead of white grains and rice.    Talk to your provider about Calcium and Vitamin D.     Lifestyle    Exercise at least 150 minutes a week (30 minutes a day, 5 days a week). This will help you control your weight and prevent disease.    Limit alcohol to one drink per day.    No smoking.     Wear sunscreen to prevent skin cancer.     See your dentist every six months for an exam and cleaning.    See your eye doctor every 1 to 2 years.            Follow-ups after your visit        Additional Services     ORTHOTICS REFERRAL       **This referral order prints off in the [facility]  (Orthotics & Prosthetics) Central Scheduling Office**    The [facility] Central Scheduling Staff will contact the patient to schedule appointments.     Central Scheduling Contact Information: [phone] ([place])    Orthotics: Bilateral Shoe/s    Please be aware that coverage of these services is subject to the terms and limitations of your health insurance plan.  Call member services at your health plan with any benefit or coverage questions.      Please bring the following to your appointment:    >>   Any x-rays, CTs or MRIs which have been performed.  Contact the facility where they were done to arrange for  prior to your scheduled appointment.    >>   List of current medications   >>   This referral request   >>   Any documents/labs given to you for this referral                  Future tests that were ordered for you today     Open Future Orders        Priority Expected Expires Ordered    MA Diagnostic Digital Left Routine  2/15/2019 2/15/2018    US Breast Left Complete 4 Quadrants Routine  2/15/2019 2/15/2018            Who to contact     If you have questions or need follow up information about today's clinic visit or your schedule please contact [facility] directly at [phone].  Normal or non-critical lab and imaging results will be communicated to you by MyChart, letter or phone within 4 business days after the clinic has received the results. If you do not hear from us within 7 days, please contact the clinic through Accedohart or phone. If you have a critical or abnormal lab result, we will notify you by phone as soon as possible.  Submit refill requests through salgomed or call your pharmacy and they will forward the refill request to us. Please allow 3 business days for your refill to be completed.          Additional Information About Your Visit        AccedoharZe-gen Information     salgomed gives you secure access to your electronic health record. If you see a primary care provider, you can also send messages to your care team and make appointments. If you have questions, please call your primary care clinic.  If you do not have a primary care provider, please call [phone] and they will assist you.        Care EveryWhere ID     This is your Care EveryWhere ID. This could be used by other organizations to access your [place] medical records  [id number]        Your Vitals Were     Pulse Temperature Respirations Last Period Pulse Oximetry BMI (Body Mass Index)    67 97.6  F (36.4  C) (Tympanic) 12 12/07/2006 100% 36 kg/m2       Blood Pressure from Last 3 Encounters:   02/15/18 126/76   12/11/17 140/89   08/14/17 135/86    Weight from Last 3 Encounters:   02/15/18 213 lb (96.6 kg)   07/13/17 205 lb (93 kg)   04/27/17 216 lb (98 kg)              We Performed the Following     Glucose     Lipid panel reflex to direct LDL Fasting     ORTHOTICS REFERRAL     TSH with free T4 reflex        Primary Care Provider Office Phone # Fax #    ZEB Cox [phone] [address] [place] [address]        Equal Access to Services     JAMES ADAMS : Julio Beckford, morteza mckay, russel hermosillo  naima carlsonnina brittanyherberth puenteaan ah. [facility] [phone].    ATENCIÓN: Si habla flakito, tiene a loera disposición servicios gratuitos de asistencia lingüística. Sadie al [phone].    We comply with applicable federal civil rights laws and [place] laws. We do not discriminate on the basis of race, color, national origin, age, disability, sex, sexual orientation, or gender identity.            Thank you!     Thank you for choosing [facility]  for your care. Our goal is always to provide you with excellent care. Hearing back from our patients is one way we can continue to improve our services. Please take a few minutes to complete the written survey that you may receive in the mail after your visit with us. Thank you!             Your Updated Medication List - Protect others around you: Learn how to safely use, store and throw away your medicines at www.disposemymeds.org.          This list is accurate as of 2/15/18 10:35 AM.  Always use your most recent med list.                   Brand Name Dispense Instructions for use Diagnosis    albuterol 108 (90 BASE) MCG/ACT Inhaler    PROAIR HFA    1 Inhaler    Inhale 2 puffs into the lungs every 4 hours as needed for shortness of breath / dyspnea or wheezing    Mild persistent asthma without complication       Calcium 3251-8166 MG-UNIT Chew      Take 2 chew tab by mouth daily Reported on 4/27/2017    Multinodular goiter       DAILY MULTIVITAMIN PO      Take 1 tablet by mouth daily Reported on 4/27/2017    Multinodular goiter       fluticasone 110 MCG/ACT Inhaler    FLOVENT HFA    1 Inhaler    Inhale 1 puff into the lungs 2 times daily    Mild persistent asthma with acute exacerbation, Cough       vitamin D 1000 UNITS capsule      Take 1 capsule by mouth daily Reported on 4/27/2017    Multinodular goiter

## 2018-02-15 NOTE — TELEPHONE ENCOUNTER
Immunization record updated.    Health care directive mailed to patient by DANIEL Boucher, .    Writer responded as per below.    SALVATORE CabreraN, RN

## 2018-02-15 NOTE — PATIENT INSTRUCTIONS
1.  Updated pap  2.  Labs today  3.  Schedule diagnostic mammogram  4.  Orthotics referral    Preventive Health Recommendations  Female Ages 50 - 64    Yearly exam: See your health care provider every year in order to  o Review health changes.   o Discuss preventive care.    o Review your medicines if your doctor has prescribed any.      Get a Pap test every three years (unless you have an abnormal result and your provider advises testing more often).    If you get Pap tests with HPV test, you only need to test every 5 years, unless you have an abnormal result.     You do not need a Pap test if your uterus was removed (hysterectomy) and you have not had cancer.    You should be tested each year for STDs (sexually transmitted diseases) if you're at risk.     Have a mammogram every 1 to 2 years.    Have a colonoscopy at age 50, or have a yearly FIT test (stool test). These exams screen for colon cancer.      Have a cholesterol test every 5 years, or more often if advised.    Have a diabetes test (fasting glucose) every three years. If you are at risk for diabetes, you should have this test more often.     If you are at risk for osteoporosis (brittle bone disease), think about having a bone density scan (DEXA).    Shots: Get a flu shot each year. Get a tetanus shot every 10 years.    Nutrition:     Eat at least 5 servings of fruits and vegetables each day.    Eat whole-grain bread, whole-wheat pasta and brown rice instead of white grains and rice.    Talk to your provider about Calcium and Vitamin D.     Lifestyle    Exercise at least 150 minutes a week (30 minutes a day, 5 days a week). This will help you control your weight and prevent disease.    Limit alcohol to one drink per day.    No smoking.     Wear sunscreen to prevent skin cancer.     See your dentist every six months for an exam and cleaning.    See your eye doctor every 1 to 2 years.

## 2018-02-15 NOTE — PROGRESS NOTES
SUBJECTIVE:   CC: Ofelia Valenzuela is an 57 year old woman who presents for preventive health visit.       Answers for HPI/ROS submitted by the patient on 2/15/2018   Annual Exam:  Getting at least 3 servings of Calcium per day:: Yes  Bi-annual eye exam:: Yes  Dental care twice a year:: NO  Sleep apnea or symptoms of sleep apnea:: Daytime drowsiness  Diet:: Regular (no restrictions)  Taking medications regularly:: Yes  Medication side effects:: None  Additional concerns today:: YES  PHQ-2 Score: 0    L breast pain.  Started out as intermittent.  Doing more stretching exercises.  No palpable mass.  Ongoing x 1.5 weeks, gotten more steady. No specific trigger for pain.     Mild persistent asthma - saw pulm 12/2017, on controller flovent.  Has prednisone on hand for exacerbations.     Follows with endo for multinodal goiter.  Stable US, normal TSH, recommend follow up in 3-4 years.    Hoping to work on weight loss.  Has had success with weight watchers in the past.  Partner is supportive, will be switching to more vegetarian diet.      Orthotics for feet help a lot with foot pain.    Sees derm regularly, recent BCC removed.      Today's PHQ-2 Score:   PHQ-2 ( 1999 [employer]) 2/15/2018 12/19/2016   Q1: Little interest or pleasure in doing things 0 -   Q2: Feeling down, depressed or hopeless 0 -   PHQ-2 Score 0 -   Q1: Little interest or pleasure in doing things Not at all Not at all   Q2: Feeling down, depressed or hopeless Not at all Not at all   PHQ-2 Score 0 0       Abuse: Current or Past(Physical, Sexual or Emotional)- No  Do you feel safe in your environment - Yes    Social History   Substance Use Topics     Smoking status: Never Smoker     Smokeless tobacco: Never Used     Alcohol use Yes      Comment: Very Rarely, glass of wine once a week/month     If you drink alcohol do you typically have >3 drinks per day or >7 drinks per week? No                     Reviewed orders with patient.  Reviewed health  maintenance and updated orders accordingly - Yes  Labs reviewed in EPIC    Mammogram not appropriate for this patient based on age.    Pertinent mammograms are reviewed under the imaging tab.  History of abnormal Pap smear:   Last 3 Pap Results:   PAP (no units)   Date Value   05/05/2014 NIL   12/24/2010 NIL   01/26/2007 NIL       Reviewed and updated as needed this visit by clinical staff  Tobacco  Allergies  Meds  Med Hx  Surg Hx  Fam Hx  Soc Hx        Reviewed and updated as needed this visit by Provider        Past Medical History:   Diagnosis Date     Knee pain      Mild persistent asthma     seen at allergy and asthma, on shots     Rotator cuff disorder     right      Past Surgical History:   Procedure Laterality Date     C NONSPECIFIC PROCEDURE  1995    fx (R) 5th metatarsal  MTT or MTP joints not involved     SURGICAL HISTORY OF -   1976    tumor removed from lymph node     SURGICAL HISTORY OF -   1969    tonsillectomy       ROS:  C: NEGATIVE for fever, chills, change in weight  INTEGUMENTARY/SKIN: as above   E: NEGATIVE for vision changes or irritation  ENT: NEGATIVE for ear, mouth and throat problems  R: NEGATIVE for significant cough or SOB  BREAST: as above   CV: NEGATIVE for chest pain, palpitations or peripheral edema  GI: NEGATIVE for nausea, abdominal pain, heartburn, or change in bowel habits  : NEGATIVE for unusual urinary or vaginal symptoms. No vaginal bleeding.  MUSCULOSKELETAL: as above   N: NEGATIVE for weakness, dizziness or paresthesias  P: NEGATIVE for changes in mood or affect     OBJECTIVE:   /76 (BP Location: Left arm, Patient Position: Chair, Cuff Size: Adult Large)  Pulse 67  Temp 97.6  F (36.4  C) (Tympanic)  Resp 12  Wt 213 lb (96.6 kg)  LMP 12/07/2006  SpO2 100%  BMI 36 kg/m2  EXAM:  GENERAL APPEARANCE: healthy, alert and no distress  EYES: Eyes grossly normal to inspection, PERRL and conjunctivae and sclerae normal  HENT: ear canals and TM's normal, nose and  mouth without ulcers or lesions, oropharynx clear and oral mucous membranes moist  NECK: no adenopathy, no asymmetry, masses, or scars and thyroid normal to palpation  RESP: lungs clear to auscultation - no rales, rhonchi or wheezes  BREAST: painful area to L breast at 7:00 position 4cm from nipple, mild tenderness to palpation, no distinct mass. no palpable axillary masses or adenopathy.  Right breast normal to palpation without mass or tenderness.    CV: regular rate and rhythm, normal S1 S2, no S3 or S4, no murmur, click or rub, no peripheral edema and peripheral pulses strong  ABDOMEN: soft, nontender, no hepatosplenomegaly, no masses and bowel sounds normal   (female): normal female external genitalia, normal urethral meatus, vaginal mucosal atrophy noted, normal cervix, adnexae, and uterus without masses or abnormal discharge  MS: no musculoskeletal defects are noted and gait is age appropriate without ataxia  SKIN: no suspicious lesions or rashes  NEURO: Normal strength and tone, sensory exam grossly normal, mentation intact and speech normal  PSYCH: mentation appears normal and affect normal/bright    ASSESSMENT/PLAN:   (Z01.419) Encounter for gynecological examination (general) (routine) without abnormal findings  (primary encounter diagnosis)  Plan: Pap imaged thin layer screen with HPV -         recommended age 30 - 65 years (select HPV order        below), HPV High Risk Types DNA Cervical            (N64.4) Breast pain, left  Comment: no palpable mass on exam  Plan: MA Diagnostic Digital Left, US Breast Left         Complete 4 Quadrants        She will schedule diagnostic mammo    (J45.31) Mild persistent asthma with acute exacerbation  Comment: controlled  Plan: monitor    (E04.2) Multinodular goiter  Plan: TSH with free T4 reflex        Follow up with endo in 3 years    (M79.671,  M79.672) Bilateral foot pain  Plan: ORTHOTICS REFERRAL        Has had success with orthotics in the past    (Z85.828)  "History of basal cell carcinoma  Plan: following with derm     (Z13.6) CARDIOVASCULAR SCREENING; LDL GOAL LESS THAN 160  Plan: Lipid panel reflex to direct LDL Fasting            (Z13.1) Screening for diabetes mellitus  Plan: Glucose                    COUNSELING:   Reviewed preventive health counseling, as reflected in patient instructions         reports that she has never smoked. She has never used smokeless tobacco.    Estimated body mass index is 36 kg/(m^2) as calculated from the following:    Height as of 7/13/17: 5' 4.5\" (1.638 m).    Weight as of this encounter: 213 lb (96.6 kg).   Weight management plan: Discussed healthy diet and exercise guidelines and patient will follow up in 12 months in clinic to re-evaluate.    Counseling Resources:  ATP IV Guidelines  Pooled Cohorts Equation Calculator  Breast Cancer Risk Calculator  FRAX Risk Assessment  ICSI Preventive Guidelines  Dietary Guidelines for Americans, 2010  USDA's MyPlate  ASA Prophylaxis  Lung CA Screening    [facility]  "

## 2018-02-15 NOTE — PROGRESS NOTES
Domonique,    Normal thyroid test.    Cholesterol looks good.  Your blood sugar was normal indicating you do not have diabetes.    Kassie Benavidez, CNP

## 2018-02-19 LAB
COPATH REPORT: NORMAL
PAP: NORMAL

## 2018-02-20 LAB
FINAL DIAGNOSIS: NORMAL
HPV HR 12 DNA CVX QL NAA+PROBE: NEGATIVE
HPV16 DNA SPEC QL NAA+PROBE: NEGATIVE
HPV18 DNA SPEC QL NAA+PROBE: NEGATIVE
SPECIMEN DESCRIPTION: NORMAL
SPECIMEN SOURCE CVX/VAG CYTO: NORMAL

## 2018-02-22 ENCOUNTER — RADIANT APPOINTMENT (OUTPATIENT)
Dept: MAMMOGRAPHY | Facility: CLINIC | Age: 58
End: 2018-02-22
Attending: NURSE PRACTITIONER
Payer: COMMERCIAL

## 2018-02-22 DIAGNOSIS — N64.4 BREAST PAIN, LEFT: ICD-10-CM

## 2018-04-12 ENCOUNTER — E-VISIT (OUTPATIENT)
Dept: FAMILY MEDICINE | Facility: CLINIC | Age: 58
End: 2018-04-12
Payer: COMMERCIAL

## 2018-04-12 ENCOUNTER — MYC MEDICAL ADVICE (OUTPATIENT)
Dept: FAMILY MEDICINE | Facility: CLINIC | Age: 58
End: 2018-04-12

## 2018-04-12 DIAGNOSIS — J01.90 ACUTE NON-RECURRENT SINUSITIS, UNSPECIFIED LOCATION: Primary | ICD-10-CM

## 2018-04-12 PROCEDURE — 99444 ZZC PHYSICIAN ONLINE EVALUATION & MANAGEMENT SERVICE: CPT | Performed by: NURSE PRACTITIONER

## 2018-04-13 RX ORDER — DOXYCYCLINE 100 MG/1
100 CAPSULE ORAL 2 TIMES DAILY
Qty: 20 CAPSULE | Refills: 0 | Status: SHIPPED | OUTPATIENT
Start: 2018-04-13

## 2019-09-28 ENCOUNTER — HEALTH MAINTENANCE LETTER (OUTPATIENT)
Age: 59
End: 2019-09-28

## 2020-03-26 NOTE — TELEPHONE ENCOUNTER
Kassie -- please see pt message below. Will advise appointment if symptoms persist, but any indication to stop/switch Flovent at this time?    Thank you  Carina Newton, SALVATOREN, RN  [facility]      26-Mar-2020 14:23

## 2021-01-10 ENCOUNTER — HEALTH MAINTENANCE LETTER (OUTPATIENT)
Age: 61
End: 2021-01-10

## 2021-07-29 ENCOUNTER — TRANSCRIBE ORDERS (OUTPATIENT)
Dept: GASTROENTEROLOGY | Facility: OUTPATIENT CENTER | Age: 61
End: 2021-07-29

## 2021-07-29 DIAGNOSIS — Z12.11 SCREENING FOR COLON CANCER: Primary | ICD-10-CM

## 2021-10-23 ENCOUNTER — HEALTH MAINTENANCE LETTER (OUTPATIENT)
Age: 61
End: 2021-10-23

## 2022-02-12 ENCOUNTER — HEALTH MAINTENANCE LETTER (OUTPATIENT)
Age: 62
End: 2022-02-12

## 2022-10-10 ENCOUNTER — HEALTH MAINTENANCE LETTER (OUTPATIENT)
Age: 62
End: 2022-10-10

## 2023-08-19 ENCOUNTER — HEALTH MAINTENANCE LETTER (OUTPATIENT)
Age: 63
End: 2023-08-19

## (undated) RX ORDER — ALBUTEROL SULFATE 0.83 MG/ML
SOLUTION RESPIRATORY (INHALATION)
Status: DISPENSED
Start: 2017-08-14